# Patient Record
Sex: MALE | Race: WHITE | NOT HISPANIC OR LATINO | Employment: FULL TIME | URBAN - METROPOLITAN AREA
[De-identification: names, ages, dates, MRNs, and addresses within clinical notes are randomized per-mention and may not be internally consistent; named-entity substitution may affect disease eponyms.]

---

## 2017-02-07 ENCOUNTER — OFFICE VISIT (OUTPATIENT)
Dept: MEDICAL GROUP | Facility: MEDICAL CENTER | Age: 32
End: 2017-02-07
Payer: COMMERCIAL

## 2017-02-07 VITALS
DIASTOLIC BLOOD PRESSURE: 90 MMHG | OXYGEN SATURATION: 97 % | WEIGHT: 197 LBS | RESPIRATION RATE: 16 BRPM | HEART RATE: 102 BPM | BODY MASS INDEX: 23.99 KG/M2 | SYSTOLIC BLOOD PRESSURE: 132 MMHG | TEMPERATURE: 98.8 F | HEIGHT: 76 IN

## 2017-02-07 DIAGNOSIS — N34.2 URETHRITIS: ICD-10-CM

## 2017-02-07 PROCEDURE — 99213 OFFICE O/P EST LOW 20 MIN: CPT | Performed by: INTERNAL MEDICINE

## 2017-02-07 RX ORDER — DOXYCYCLINE HYCLATE 100 MG
100 TABLET ORAL 2 TIMES DAILY
Qty: 12 TAB | Refills: 0 | Status: SHIPPED | OUTPATIENT
Start: 2017-02-07 | End: 2017-02-15

## 2017-02-07 NOTE — ASSESSMENT & PLAN NOTE
This is a patient of Dr. Arnold who is seen today on a same-day appointment reporting that in January about 2 weeks ago he and his wife had a motel room and had fairly vigorous intercourse that night. Since that time he has noticed intermittent dysuria that he describes as a burning at the tip of his penis at the end of urinating. He denies urethral discharge. He denies nocturia or urinary frequency. Neither he nor his wife had other sexual partners in a very long time. He has not noticed any lymphadenopathy. He denies fevers or chills. He denies any rectal or prostate discomfort. Symptoms are not changing significantly. He denies similar such prior symptoms.

## 2017-02-07 NOTE — MR AVS SNAPSHOT
"        Roge Meade   2017 9:00 AM   Office Visit   MRN: [id number]    Department:  [address] [place] [address]   Dept Phone:  [phone]    Description:  Male : 1985   Provider:  Pierre Phillip M.D.           Reason for Visit     Dysuria possible      Allergies as of 2017     Allergen Noted Reactions    Sulfa Drugs 10/20/2016   Anaphylaxis      You were diagnosed with     Urethritis   [[id number]]         Vital Signs     Blood Pressure Pulse Temperature Respirations Height Weight    132/90 mmHg 102 37.1 °C (98.8 °F) 16 1.93 m (6' 3.98\") 89.359 kg (197 lb)    Body Mass Index Oxygen Saturation Smoking Status             23.99 kg/m2 97% Never Smoker          Basic Information     Date Of Birth Sex Race Ethnicity Preferred Language    1985 Male White Non- English      Health Maintenance        Date Due Completion Dates    IMM DTaP/Tdap/Td Vaccine (1 - Tdap) 2004 ---    IMM INFLUENZA (1) 2016 ---            Current Immunizations     Tuberculin Skin Test 2015      Below and/or attached are the medications your provider expects you to take. Review all of your home medications and newly ordered medications with your provider and/or pharmacist. Follow medication instructions as directed by your provider and/or pharmacist. Please keep your medication list with you and share with your provider. Update the information when medications are discontinued, doses are changed, or new medications (including over-the-counter products) are added; and carry medication information at all times in the event of emergency situations     Allergies:  SULFA DRUGS - Anaphylaxis               Medications  Valid as of: 2017 - 10:37 AM    Generic Name Brand Name Tablet Size Instructions for use    Aspirin (Tab) aspirin 81 MG Take 81 mg by mouth every day.        Doxycycline Hyclate (Tab) VIBRAMYCIN 100 MG Take 1 Tab by mouth 2 times a day.        Lisinopril (Tab) PRINIVIL 10 MG Take 10 mg by " mouth every day.        Losartan Potassium (Tab) COZAAR 25 MG Take 1 Tab by mouth every day.        .                 Medicines prescribed today were sent to:     [facility] [address] [place] [address], [place] - [address] [place] [address]    Phone: [phone] Fax: [phone]    Open 24 Hours?: No      Medication refill instructions:       If your prescription bottle indicates you have medication refills left, it is not necessary to call your provider’s office. Please contact your pharmacy and they will refill your medication.    If your prescription bottle indicates you do not have any refills left, you may request refills at any time through one of the following ways: The online [employer] system (except Urgent Care), by calling your provider’s office, or by asking your pharmacy to contact your provider’s office with a refill request. Medication refills are processed only during regular business hours and may not be available until the next business day. Your provider may request additional information or to have a follow-up visit with you prior to refilling your medication.   *Please Note: Medication refills are assigned a new Rx number when refilled electronically. Your pharmacy may indicate that no refills were authorized even though a new prescription for the same medication is available at the pharmacy. Please request the medicine by name with the pharmacy before contacting your provider for a refill.           [employer] Status: Patient Declined

## 2017-02-07 NOTE — PROGRESS NOTES
Subjective:     Chief Complaint   Patient presents with   • Dysuria     possible     Roge Meade is a 31 y.o. male here today for evaluation of burning discomfort at the end of urinating at the tip of his penis.    Urethritis  This is a patient of Dr. Arnold who is seen today on a same-day appointment reporting that in January about 2 weeks ago he and his wife had a motel room and had fairly vigorous intercourse that night. Since that time he has noticed intermittent dysuria that he describes as a burning at the tip of his penis at the end of urinating. He denies urethral discharge. He denies nocturia or urinary frequency. Neither he nor his wife had other sexual partners in a very long time. He has not noticed any lymphadenopathy. He denies fevers or chills. He denies any rectal or prostate discomfort. Symptoms are not changing significantly. He denies similar such prior symptoms. His wife does not have any symptoms of vaginitis or other irritation.         The encounter diagnosis was Urethritis.    Allergies: Sulfa drugs  Current medicines (including changes today)  Current Outpatient Prescriptions   Medication Sig Dispense Refill   • doxycycline (VIBRAMYCIN) 100 MG Tab Take 1 Tab by mouth 2 times a day. 12 Tab 0   • lisinopril (PRINIVIL) 10 MG Tab Take 10 mg by mouth every day.     • losartan (COZAAR) 25 MG Tab Take 1 Tab by mouth every day. 30 Tab 0   • aspirin 81 MG tablet Take 81 mg by mouth every day.       No current facility-administered medications for this visit.       He  has a past medical history of Knee joint cyst (2002).    ROS    Patient denies significant change in strength, weight or appetite.  No new dyspnea, coughing, chest pain, or palpitations.  No indigestion, abdominal pain, or change in bowel habits.  No change in urinating. Mild burning discomfort at the end of urination at the tip of his penis.  No new ankle swelling.       Objective:     PE:  /90 mmHg  Pulse 102  Temp(Src)  "37.1 °C (98.8 °F)  Resp 16  Ht 1.93 m (6' 3.98\")  Wt 89.359 kg (197 lb)  BMI 23.99 kg/m2  SpO2 97%  Neck is supple without significant lymphadenopathy or masses.  Lungs are clear with normal breath sounds without wheezes or rales .  Cardiovascular: peripheral circulation is satisfactory, heart sounds are unchanged and unremarkable.  Abdomen is soft, without masses or tenderness, with normal bowel sounds. There is no CVA tenderness. There is no inguinal adenopathy. Genitalia are normal male without evidence of urethral discharge or inflammation.  Extremities are without significant edema, cyanosis or deformity.      Assessment and Plan:   The following treatment plan was discussed  1. Urethritis      We will treat with doxycycline assuming this is nonspecific urethritis. If he is not better or if he worsens at all, he will contact us.       Followup: He will keep his next scheduled appointment with Dr. Arnold or seek further medical treatment as needed if symptoms persist or do not improve.             "

## 2017-02-10 ENCOUNTER — TELEPHONE (OUTPATIENT)
Dept: MEDICAL GROUP | Facility: MEDICAL CENTER | Age: 32
End: 2017-02-10

## 2017-02-10 NOTE — TELEPHONE ENCOUNTER
1. Caller Name: Roge Meade                                           Call Back Number: [phone] (home) [phone] (work)        Patient approves a detailed voicemail message: N\A    Patient stated that he saw you 1 time on 2/7, he said that you prescribed the medication doxycycline (VIBRAMYCIN) 100 MG Tab , he stated that you told him to call back if he was not improving, Patient states that he has not improved with the medication and he states he is having pain and soreness in the middle upper part of his back not on the spine but on the sides of his back, Patient wanted to know what else he can take or do for this? Thank you Please advise

## 2017-02-12 DIAGNOSIS — R30.0 DYSURIA: ICD-10-CM

## 2017-02-13 ENCOUNTER — HOSPITAL ENCOUNTER (OUTPATIENT)
Dept: LAB | Facility: MEDICAL CENTER | Age: 32
End: 2017-02-13
Attending: INTERNAL MEDICINE
Payer: COMMERCIAL

## 2017-02-13 DIAGNOSIS — R30.0 DYSURIA: ICD-10-CM

## 2017-02-13 LAB
APPEARANCE UR: CLEAR
BILIRUB UR QL STRIP.AUTO: NEGATIVE
COLOR UR AUTO: YELLOW
CULTURE IF INDICATED INDCX: NO UA CULTURE
GLUCOSE UR STRIP.AUTO-MCNC: NEGATIVE MG/DL
KETONES UR STRIP.AUTO-MCNC: NEGATIVE MG/DL
LEUKOCYTE ESTERASE UR QL STRIP.AUTO: NEGATIVE
MICRO URNS: NORMAL
NITRITE UR QL STRIP.AUTO: NEGATIVE
PH UR: 5.5 [PH]
PROT UR QL STRIP: NEGATIVE MG/DL
RBC UR QL AUTO: NEGATIVE
SP GR UR STRIP.AUTO: 1.02

## 2017-02-13 PROCEDURE — 87491 CHLMYD TRACH DNA AMP PROBE: CPT

## 2017-02-13 PROCEDURE — 81003 URINALYSIS AUTO W/O SCOPE: CPT

## 2017-02-13 PROCEDURE — 87591 N.GONORRHOEAE DNA AMP PROB: CPT

## 2017-02-14 LAB
C TRACH DNA GENITAL QL NAA+PROBE: NEGATIVE
N GONORRHOEA DNA GENITAL QL NAA+PROBE: NEGATIVE
SPECIMEN SOURCE: NORMAL

## 2017-02-15 ENCOUNTER — OFFICE VISIT (OUTPATIENT)
Dept: MEDICAL GROUP | Facility: PHYSICIAN GROUP | Age: 32
End: 2017-02-15
Payer: COMMERCIAL

## 2017-02-15 VITALS
HEIGHT: 76 IN | TEMPERATURE: 100.3 F | HEART RATE: 102 BPM | DIASTOLIC BLOOD PRESSURE: 78 MMHG | SYSTOLIC BLOOD PRESSURE: 128 MMHG | OXYGEN SATURATION: 96 % | BODY MASS INDEX: 27.4 KG/M2 | WEIGHT: 225 LBS | RESPIRATION RATE: 16 BRPM

## 2017-02-15 DIAGNOSIS — R30.0 DYSURIA: ICD-10-CM

## 2017-02-15 PROCEDURE — 99214 OFFICE O/P EST MOD 30 MIN: CPT | Performed by: INTERNAL MEDICINE

## 2017-02-15 RX ORDER — PHENAZOPYRIDINE HYDROCHLORIDE 200 MG/1
200 TABLET, FILM COATED ORAL 3 TIMES DAILY PRN
Qty: 6 TAB | Refills: 0 | Status: SHIPPED | OUTPATIENT
Start: 2017-02-15 | End: 2017-09-06

## 2017-02-15 NOTE — MR AVS SNAPSHOT
"        Roge Pereiradugo   2/15/2017 2:20 PM   Office Visit   MRN: [id number]    Department:  [facility]   Dept Phone:  [phone]    Description:  Male : 1985   Provider:  Tiff Cox M.D.           Reason for Visit     Dysuria had UA done on 17 has finished antibiotics but still has pain and frequency       Allergies as of 2/15/2017     Allergen Noted Reactions    Sulfa Drugs 10/20/2016   Anaphylaxis      You were diagnosed with     Dysuria   [788.1.ICD-9-CM]         Vital Signs     Blood Pressure Pulse Temperature Respirations Height Weight    128/78 mmHg 102 37.9 °C (100.3 °F) 16 1.93 m (6' 3.98\") 102.059 kg (225 lb)    Body Mass Index Oxygen Saturation Smoking Status             27.40 kg/m2 96% Never Smoker          Basic Information     Date Of Birth Sex Race Ethnicity Preferred Language    1985 Male White Non- English      Problem List              ICD-10-CM Priority Class Noted - Resolved    Urethritis N34.2   2017 - Present    Dysuria R30.0   2/15/2017 - Present      Health Maintenance        Date Due Completion Dates    IMM DTaP/Tdap/Td Vaccine (1 - Tdap) 2004 ---    IMM INFLUENZA (1) 2016 ---            Current Immunizations     Tuberculin Skin Test 2015      Below and/or attached are the medications your provider expects you to take. Review all of your home medications and newly ordered medications with your provider and/or pharmacist. Follow medication instructions as directed by your provider and/or pharmacist. Please keep your medication list with you and share with your provider. Update the information when medications are discontinued, doses are changed, or new medications (including over-the-counter products) are added; and carry medication information at all times in the event of emergency situations     Allergies:  SULFA DRUGS - Anaphylaxis               Medications  Valid as of: February 15, 2017 -  2:55 PM    Generic Name Brand Name " Tablet Size Instructions for use    Aspirin (Tab) aspirin 81 MG Take 81 mg by mouth every day.        Lisinopril (Tab) PRINIVIL 10 MG Take 10 mg by mouth every day.        Phenazopyridine HCl (Tab) PYRIDIUM 200 MG Take 1 Tab by mouth 3 times a day as needed.        .                 Medicines prescribed today were sent to:     [facility] [address] [place] [address], [place] - [address] [facility]    [address] [facility] [place] [address]    Phone: [phone] Fax: [phone]    Open 24 Hours?: No      Medication refill instructions:       If your prescription bottle indicates you have medication refills left, it is not necessary to call your provider’s office. Please contact your pharmacy and they will refill your medication.    If your prescription bottle indicates you do not have any refills left, you may request refills at any time through one of the following ways: The online [employer] system (except Urgent Care), by calling your provider’s office, or by asking your pharmacy to contact your provider’s office with a refill request. Medication refills are processed only during regular business hours and may not be available until the next business day. Your provider may request additional information or to have a follow-up visit with you prior to refilling your medication.   *Please Note: Medication refills are assigned a new Rx number when refilled electronically. Your pharmacy may indicate that no refills were authorized even though a new prescription for the same medication is available at the pharmacy. Please request the medicine by name with the pharmacy before contacting your provider for a refill.           [employer] Access Code: Activation code not generated  Current [employer] Status: Active

## 2017-02-15 NOTE — PROGRESS NOTES
Chief Complaint   Patient presents with   • Dysuria     had UA done on 17 has finished antibiotics but still has pain and frequency        HISTORY OF PRESENT ILLNESS: Patient is a 31 y.o. male patient who presents today for follow-up of dysuria which was treated as urethritis.     1. Dysuria    Approximately 3 weeks ago the patient developed urinary urgency, frequency, dysuria, and burning at the tip of his penis. He was seen by Dr. Phillip and treated empirically for urethritis with doxycycline. He had urinalysis, GC, and chlamydia testing done all of which were completely negative. The patient is extremely anxious because his father  of bladder cancer at age 54 and he presented with similar symptoms to what the patient is experiencing today. He also reports some lower back pain, and question of a rash in his groin. He is quite anxious and agitated today regarding all of his symptoms.      Patient Active Problem List    Diagnosis Date Noted   • Dysuria 02/15/2017   • Urethritis 2017      Allergies:Sulfa drugs    Current meds including changes today  Current Outpatient Prescriptions   Medication Sig Dispense Refill   • phenazopyridine (PYRIDIUM) 200 MG Tab Take 1 Tab by mouth 3 times a day as needed. 6 Tab 0   • lisinopril (PRINIVIL) 10 MG Tab Take 10 mg by mouth every day.     • aspirin 81 MG tablet Take 81 mg by mouth every day.       No current facility-administered medications for this visit.     Social History   Substance Use Topics   • Smoking status: Never Smoker    • Smokeless tobacco: Never Used      Comment: continue to avoid   • Alcohol Use: 4.2 oz/week     7 Shots of liquor per week     Social History     Social History Narrative       Family History   Problem Relation Age of Onset   • Stroke Mother    • Diabetes Mother    • Hypertension Father    • Cancer Father      bladder   • Alcohol/Drug Father    • Psychiatry Sister      eating disorder   • Asthma Son    • No Known Problems Daughter  "       Review of Systems:  No chest pain, No shortness of breath, No dyspnea on exertion  Gastrointestinal ROS: No abdominal pain, No nausea, vomiting, diarrhea, or constipation      Exam:  Pleasant extremely anxious man fairly tremulous and somewhat diaphoretic  Blood pressure 128/78, pulse 102, temperature 37.9 °C (100.3 °F), resp. rate 16, height 1.93 m (6' 3.98\"), weight 102.059 kg (225 lb), SpO2 96 %.  General:  Well nourished, well developed male in NAD affect and mood within normal limits  Head is grossly normal.  Neck: Supple without adenopathy  Pulmonary: Clear to ausculation.  Normal effort. No rales, rhonchi, or wheezing.  Cardiovascular: Regular rate and rhythm without murmur.   Extremities: no clubbing, cyanosis, or edema.  : Penis is normal without discharge he has several scaly small erythematous patches in bilateral groin area consistent with tinea.  Neuro: moves all extremities symmetrically      Please note that this dictation was created using voice recognition software. I have made every reasonable attempt to correct obvious errors, but I expect that there are errors of grammar and possibly content that I did not discover before finalizing the note.    Assessment/Plan:  1. Dysuria    At this time I have no etiology for the patient's symptoms. He is extremely anxious and it is possible that some of this is anxiety induced. Will give a trial of bladder analgesia with Pyridium, I did reassure patient that I didn't think anything sinister was coming on. There is definitely no evidence for any STD, other infection, or cancer. If he continues to have symptoms over the next week or 2 the next step would be referral to urologist for possible cystoscopy.  - phenazopyridine (PYRIDIUM) 200 MG Tab; Take 1 Tab by mouth 3 times a day as needed.  Dispense: 6 Tab; Refill: 0    Followup: No Follow-up on file.            "

## 2017-02-16 ENCOUNTER — TELEPHONE (OUTPATIENT)
Dept: MEDICAL GROUP | Facility: PHYSICIAN GROUP | Age: 32
End: 2017-02-16

## 2017-02-16 ENCOUNTER — PATIENT MESSAGE (OUTPATIENT)
Dept: MEDICAL GROUP | Facility: PHYSICIAN GROUP | Age: 32
End: 2017-02-16

## 2017-02-16 DIAGNOSIS — R30.0 DYSURIA: ICD-10-CM

## 2017-02-16 NOTE — TELEPHONE ENCOUNTER
1. Caller Name: Roge                                         Call Back Number: [phone] (home) [phone] (work)        Patient approves a detailed voicemail message: N\A    2. SPECIFIC Action To Be Taken: Referral Needed    3. Diagnosis/Clinical Reason for Request:   Urethritis   Dysuria             4. Specialty & Provider Name/Lab/Imaging Location: Urology    5. Is appointment scheduled for requested order/referral: no    Patient informed they will receive a return phone call from the office ONLY if there are any questions before processing their request. Advised to call back if they haven't received a call from the referral department in 5 days.

## 2017-03-13 ENCOUNTER — HOSPITAL ENCOUNTER (OUTPATIENT)
Dept: LAB | Facility: MEDICAL CENTER | Age: 32
End: 2017-03-13
Attending: INTERNAL MEDICINE
Payer: COMMERCIAL

## 2017-03-13 ENCOUNTER — OFFICE VISIT (OUTPATIENT)
Dept: MEDICAL GROUP | Facility: PHYSICIAN GROUP | Age: 32
End: 2017-03-13
Payer: COMMERCIAL

## 2017-03-13 VITALS
BODY MASS INDEX: 26.67 KG/M2 | HEART RATE: 94 BPM | DIASTOLIC BLOOD PRESSURE: 90 MMHG | OXYGEN SATURATION: 99 % | SYSTOLIC BLOOD PRESSURE: 130 MMHG | HEIGHT: 76 IN | WEIGHT: 219 LBS | TEMPERATURE: 99.7 F | RESPIRATION RATE: 16 BRPM

## 2017-03-13 DIAGNOSIS — R30.0 DYSURIA: ICD-10-CM

## 2017-03-13 DIAGNOSIS — R10.11 RUQ CRAMPING: ICD-10-CM

## 2017-03-13 LAB
ALBUMIN SERPL BCP-MCNC: 4.8 G/DL (ref 3.2–4.9)
ALBUMIN/GLOB SERPL: 1.8 G/DL
ALP SERPL-CCNC: 47 U/L (ref 30–99)
ALT SERPL-CCNC: 21 U/L (ref 2–50)
ANION GAP SERPL CALC-SCNC: 7 MMOL/L (ref 0–11.9)
APPEARANCE UR: CLEAR
AST SERPL-CCNC: 23 U/L (ref 12–45)
BASOPHILS # BLD AUTO: 0.04 K/UL (ref 0–0.12)
BASOPHILS NFR BLD AUTO: 0.7 % (ref 0–1.8)
BILIRUB SERPL-MCNC: 0.9 MG/DL (ref 0.1–1.5)
BILIRUB UR QL STRIP.AUTO: NEGATIVE
BUN SERPL-MCNC: 14 MG/DL (ref 8–22)
CALCIUM SERPL-MCNC: 9.7 MG/DL (ref 8.5–10.5)
CHLORIDE SERPL-SCNC: 103 MMOL/L (ref 96–112)
CO2 SERPL-SCNC: 27 MMOL/L (ref 20–33)
COLOR UR AUTO: COLORLESS
CREAT SERPL-MCNC: 0.97 MG/DL (ref 0.5–1.4)
CULTURE IF INDICATED INDCX: NO UA CULTURE
EOSINOPHIL # BLD: 0.04 K/UL (ref 0–0.51)
EOSINOPHIL NFR BLD AUTO: 0.7 % (ref 0–6.9)
ERYTHROCYTE [DISTWIDTH] IN BLOOD BY AUTOMATED COUNT: 40.1 FL (ref 35.9–50)
GLOBULIN SER CALC-MCNC: 2.6 G/DL (ref 1.9–3.5)
GLUCOSE SERPL-MCNC: 101 MG/DL (ref 65–99)
GLUCOSE UR STRIP.AUTO-MCNC: NEGATIVE MG/DL
HCT VFR BLD AUTO: 46.1 % (ref 42–52)
HGB BLD-MCNC: 15.3 G/DL (ref 14–18)
IMM GRANULOCYTES # BLD AUTO: 0.02 K/UL (ref 0–0.11)
IMM GRANULOCYTES NFR BLD AUTO: 0.4 % (ref 0–0.9)
KETONES UR STRIP.AUTO-MCNC: NEGATIVE MG/DL
LEUKOCYTE ESTERASE UR QL STRIP.AUTO: NEGATIVE
LYMPHOCYTES # BLD: 1.96 K/UL (ref 1–4.8)
LYMPHOCYTES NFR BLD AUTO: 35.8 % (ref 22–41)
MCH RBC QN AUTO: 30.4 PG (ref 27–33)
MCHC RBC AUTO-ENTMCNC: 33.2 G/DL (ref 33.7–35.3)
MCV RBC AUTO: 91.7 FL (ref 81.4–97.8)
MICRO URNS: NORMAL
MONOCYTES # BLD: 0.45 K/UL (ref 0–0.85)
MONOCYTES NFR BLD AUTO: 8.2 % (ref 0–13.4)
NEUTROPHILS # BLD: 2.97 K/UL (ref 1.82–7.42)
NEUTROPHILS NFR BLD AUTO: 54.2 % (ref 44–72)
NITRITE UR QL STRIP.AUTO: NEGATIVE
NRBC # BLD AUTO: 0 K/UL
NRBC BLD-RTO: 0 /100 WBC
PH UR: 7 [PH]
PLATELET # BLD AUTO: 208 K/UL (ref 164–446)
PMV BLD AUTO: 11.1 FL (ref 9–12.9)
POTASSIUM SERPL-SCNC: 4.6 MMOL/L (ref 3.6–5.5)
PROT SERPL-MCNC: 7.4 G/DL (ref 6–8.2)
PROT UR QL STRIP: NEGATIVE MG/DL
RBC # BLD AUTO: 5.03 M/UL (ref 4.7–6.1)
RBC UR QL AUTO: NEGATIVE
SODIUM SERPL-SCNC: 137 MMOL/L (ref 135–145)
SP GR UR STRIP.AUTO: 1.01
WBC # BLD AUTO: 5.5 K/UL (ref 4.8–10.8)

## 2017-03-13 PROCEDURE — 99214 OFFICE O/P EST MOD 30 MIN: CPT | Performed by: INTERNAL MEDICINE

## 2017-03-13 PROCEDURE — 36415 COLL VENOUS BLD VENIPUNCTURE: CPT

## 2017-03-13 PROCEDURE — 81003 URINALYSIS AUTO W/O SCOPE: CPT

## 2017-03-13 PROCEDURE — 80053 COMPREHEN METABOLIC PANEL: CPT

## 2017-03-13 PROCEDURE — 85025 COMPLETE CBC W/AUTO DIFF WBC: CPT

## 2017-03-13 ASSESSMENT — PAIN SCALES - GENERAL: PAINLEVEL: 4=SLIGHT-MODERATE PAIN

## 2017-03-13 NOTE — MR AVS SNAPSHOT
"        Roge Gabebladimir Meade   3/13/2017 8:45 AM   Office Visit   MRN: [id number]    Department:  [facility]   Dept Phone:  [phone]    Description:  Male : 1985   Provider:  Chio Barillas D.O.           Reason for Visit     Dysuria Painful urination x2 mos    Dizziness dizziness & abdominal pain x1 week      Allergies as of 3/13/2017     Allergen Noted Reactions    Sulfa Drugs 10/20/2016   Anaphylaxis      You were diagnosed with     RUQ cramping   [903107]       Dysuria   [788.1.ICD-9-CM]         Vital Signs     Blood Pressure Pulse Temperature Respirations Height Weight    130/90 mmHg 94 37.6 °C (99.7 °F) 16 1.93 m (6' 3.98\") 99.338 kg (219 lb)    Body Mass Index Oxygen Saturation Smoking Status             26.67 kg/m2 99% Never Smoker          Basic Information     Date Of Birth Sex Race Ethnicity Preferred Language    1985 Male White Non- English      Your appointments     Mar 19, 2017  9:30 AM   US ABDOMEN FASTING (30 MINUTES) with [facility] ([place]    [address] [facility] [id number] [phone]           NPO 8 hours.  For  Abdomen, NPO 2 hours, schedule Abdomen Complete and include \" Abdomen\" in Appt Notes.              Problem List              ICD-10-CM Priority Class Noted - Resolved    Urethritis N34.2   2017 - Present    Dysuria R30.0   2/15/2017 - Present      Health Maintenance        Date Due Completion Dates    IMM DTaP/Tdap/Td Vaccine (1 - Tdap) 2004 ---    IMM INFLUENZA (1) 2016 ---            Current Immunizations     Tuberculin Skin Test 2015      Below and/or attached are the medications your provider expects you to take. Review all of your home medications and newly ordered medications with your provider and/or pharmacist. Follow medication instructions as directed by your provider and/or pharmacist. Please keep your medication list with you and share with your provider. Update the information when " medications are discontinued, doses are changed, or new medications (including over-the-counter products) are added; and carry medication information at all times in the event of emergency situations     Allergies:  SULFA DRUGS - Anaphylaxis               Medications  Valid as of: March 13, 2017 - 11:55 AM    Generic Name Brand Name Tablet Size Instructions for use    Aspirin (Tab) aspirin 81 MG Take 81 mg by mouth every day.        Lisinopril (Tab) PRINIVIL 10 MG Take 10 mg by mouth every day.        Phenazopyridine HCl (Tab) PYRIDIUM 200 MG Take 1 Tab by mouth 3 times a day as needed.        .                 Medicines prescribed today were sent to:     [facility] [address] [place] [address], [place] - 250 [facility]    250 [facility] [place] [address]    Phone: [phone] Fax: [phone]    Open 24 Hours?: No      Medication refill instructions:       If your prescription bottle indicates you have medication refills left, it is not necessary to call your provider’s office. Please contact your pharmacy and they will refill your medication.    If your prescription bottle indicates you do not have any refills left, you may request refills at any time through one of the following ways: The online [employer] system (except Urgent Care), by calling your provider’s office, or by asking your pharmacy to contact your provider’s office with a refill request. Medication refills are processed only during regular business hours and may not be available until the next business day. Your provider may request additional information or to have a follow-up visit with you prior to refilling your medication.   *Please Note: Medication refills are assigned a new Rx number when refilled electronically. Your pharmacy may indicate that no refills were authorized even though a new prescription for the same medication is available at the pharmacy. Please request the medicine by name with the pharmacy before contacting your provider for a  refill.        Your To Do List     Future Labs/Procedures Complete By Expires    CBC WITH DIFFERENTIAL  As directed 3/14/2018    COMP METABOLIC PANEL  As directed 3/14/2018    URINALYSIS,CULTURE IF INDICATED  As directed 3/13/2018    US-LIVER AND BILIARY TREE  As directed 9/13/2017      Instructions    Abdominal Pain (Nonspecific)  Your exam might not show the exact reason you have abdominal pain. Since there are many different causes of abdominal pain, another checkup and more tests may be needed. It is very important to follow up for lasting (persistent) or worsening symptoms. A possible cause of abdominal pain in any person who still has his or her appendix is acute appendicitis. Appendicitis is often hard to diagnose. Normal blood tests, urine tests, ultrasound, and CT scans do not completely rule out early appendicitis or other causes of abdominal pain. Sometimes, only the changes that happen over time will allow appendicitis and other causes of abdominal pain to be determined. Other potential problems that may require surgery may also take time to become more apparent. Because of this, it is important that you follow all of the instructions below.  HOME CARE INSTRUCTIONS   · Rest as much as possible.   · Do not eat solid food until your pain is gone.   · While adults or children have pain: A diet of water, weak decaffeinated tea, broth or bouillon, gelatin, oral rehydration solutions (ORS), frozen ice pops, or ice chips may be helpful.   · When pain is gone in adults or children: Start a light diet (dry toast, crackers, applesauce, or white rice). Increase the diet slowly as long as it does not bother you. Eat no dairy products (including cheese and eggs) and no spicy, fatty, fried, or high-fiber foods.   · Use no alcohol, caffeine, or cigarettes.   · Take your regular medicines unless your caregiver told you not to.   · Take any prescribed medicine as directed.   · Only take over-the-counter or prescription  medicines for pain, discomfort, or fever as directed by your caregiver. Do not give aspirin to children.   If your caregiver has given you a follow-up appointment, it is very important to keep that appointment. Not keeping the appointment could result in a permanent injury and/or lasting (chronic) pain and/or disability. If there is any problem keeping the appointment, you must call to reschedule.   SEEK IMMEDIATE MEDICAL CARE IF:   · Your pain is not gone in 24 hours.   · Your pain becomes worse, changes location, or feels different.   · You or your child has an oral temperature above 102° F (38.9° C), not controlled by medicine.   · Your baby is older than 3 months with a rectal temperature of 102° F (38.9° C) or higher.   · Your baby is 3 months old or younger with a rectal temperature of 100.4° F (38° C) or higher.   · You have shaking chills.   · You keep throwing up (vomiting) or cannot drink liquids.   · There is blood in your vomit or you see blood in your bowel movements.   · Your bowel movements become dark or black.   · You have frequent bowel movements.   · Your bowel movements stop (become blocked) or you cannot pass gas.   · You have bloody, frequent, or painful urination.   · You have yellow discoloration in the skin or whites of the eyes.   · Your stomach becomes bloated or bigger.   · You have dizziness or fainting.   · You have chest or back pain.   MAKE SURE YOU:   · Understand these instructions.   · Will watch your condition.   · Will get help right away if you are not doing well or get worse.   Document Released: 12/18/2006 Document Revised: 03/11/2013 Document Reviewed: 11/15/2010  ExitCare® Patient Information ©2013 [employer].          [employer] Access Code: Activation code not generated  Current Mark Forged Status: Active

## 2017-03-13 NOTE — PROGRESS NOTES
Subjective:   Roge Meade is a 31 y.o. male here today for multiple problems as listed below.  Patient's usual primary care provider is Amparo Arnold D.O. and is new to me.     Symptom onset: 1-2 weeks ago   Current symptoms: Dysuria. No blood noted in urine. Symptoms had started the end of January after intercourse, patient noticed forceful somewhat painful urination and was treated for urinary tract infection with doxycycline. He also took cranberry pills and has been increasing his water intake. He continued to have dysuria although the symptoms are now lessened in frequency is also lessened. Was seen again a few months later and prescribed  Pyridium which did not help symptoms. He was referred to urology and has an appointment on Friday.  Since onset symptoms are: Improved, different-patient noticed 1-2 weeks ago he had started to have some other symptoms including right-sided abdominal pain (located under right rib and worse with palpation and deep inspiration-states it feels a bruise and sometimes radiates to others in the abdomen. Also had the seated bloating with nausea and increased gas/burping. No heartburn symptoms. States that he usually eats healthy but did have Burger during this time. He found this bowel movements have gotten less frequent-they appear more solid, oily, breaking apart more although they are not loose. He has some chills and sweating about half an hour in the evenings. No new foods or sick contacts. Abdomen discomfort described as a soreness/tightness especially when taking a deep breath. Patient was concerned about blood pressure medication, lisinopril which started in November-had bloody stool to time ×1 which resolved after continuing medication for 3-4 days. Did message his primary care physician about possibly stopping this as he also had some episodes of dizziness and blurred vision - currently not on lisinopril. Blood pressure-diastolic is only slightly elevated today.   "Appetite is normal. No vomiting. Concerned about gallstones through his discussion with his physician uncle who is a urologist.  Treatments tried: antispasm med. Doxycycline. Increasing hydration.  Associated symptoms: negative for vomiting, pelvic pain, current blood in stools/urine.  History is negative for frequent UTI.   Recent labs reviewed from Feb 2017 - neg GC/Chlamydia.     Objective:     Blood pressure 130/90, pulse 94, temperature 37.6 °C (99.7 °F), resp. rate 16, height 1.93 m (6' 3.98\"), weight 99.338 kg (219 lb), SpO2 99 %.  Gen: alert, NAD.  Chest: lungs clear to auscultation, CV RRR   Abdomen soft, mildy tender in suprapubic region, nondistended. Normal bowel sounds are auscultated. No organomegaly is appreciated. No masses are palpated.No CVAT. No guarding, rigidity or rebound tenderness is seen on exam. Michaels and McBurney sign is negative.  Ext. No edema, normal vascularity, color.    Assessment and Plan:   The following treatment plan was discussed   1. RUQ cramping  COMP METABOLIC PANEL    US-LIVER AND BILIARY TREE    CBC WITH DIFFERENTIAL   2. Dysuria  URINALYSIS,CULTURE IF INDICATED    CANCELED: URINALYSIS,CULTURE IF INDICATED     Symptoms are nonspecific - Ddx includes gallstones, renal stones, UTI, gastroenteritis. Will start with initial lab work and US abdomen and will notify patient with results. Previously treated for urethritis with doxycyline. Advised patient to keep appointment with urology.  RTC if symptoms not improving or in case of vomiting, fever, increasing pain.   EDUCATION: drink plenty of fluids, wipe front to back every void and BM.    Followup: Return if symptoms worsen or fail to improve, for follow-up with PCP.         Please note that dictation has been dictated using voice recognition soft ware. I have made every reasonable attempt to correct obvious errors, but I expect that there are errors of grammar and possibly content that I did not discover before finalizing the " note.

## 2017-03-13 NOTE — PATIENT INSTRUCTIONS
Abdominal Pain (Nonspecific)  Your exam might not show the exact reason you have abdominal pain. Since there are many different causes of abdominal pain, another checkup and more tests may be needed. It is very important to follow up for lasting (persistent) or worsening symptoms. A possible cause of abdominal pain in any person who still has his or her appendix is acute appendicitis. Appendicitis is often hard to diagnose. Normal blood tests, urine tests, ultrasound, and CT scans do not completely rule out early appendicitis or other causes of abdominal pain. Sometimes, only the changes that happen over time will allow appendicitis and other causes of abdominal pain to be determined. Other potential problems that may require surgery may also take time to become more apparent. Because of this, it is important that you follow all of the instructions below.  HOME CARE INSTRUCTIONS   · Rest as much as possible.   · Do not eat solid food until your pain is gone.   · While adults or children have pain: A diet of water, weak decaffeinated tea, broth or bouillon, gelatin, oral rehydration solutions (ORS), frozen ice pops, or ice chips may be helpful.   · When pain is gone in adults or children: Start a light diet (dry toast, crackers, applesauce, or white rice). Increase the diet slowly as long as it does not bother you. Eat no dairy products (including cheese and eggs) and no spicy, fatty, fried, or high-fiber foods.   · Use no alcohol, caffeine, or cigarettes.   · Take your regular medicines unless your caregiver told you not to.   · Take any prescribed medicine as directed.   · Only take over-the-counter or prescription medicines for pain, discomfort, or fever as directed by your caregiver. Do not give aspirin to children.   If your caregiver has given you a follow-up appointment, it is very important to keep that appointment. Not keeping the appointment could result in a permanent injury and/or lasting (chronic) pain  and/or disability. If there is any problem keeping the appointment, you must call to reschedule.   SEEK IMMEDIATE MEDICAL CARE IF:   · Your pain is not gone in 24 hours.   · Your pain becomes worse, changes location, or feels different.   · You or your child has an oral temperature above 102° F (38.9° C), not controlled by medicine.   · Your baby is older than 3 months with a rectal temperature of 102° F (38.9° C) or higher.   · Your baby is 3 months old or younger with a rectal temperature of 100.4° F (38° C) or higher.   · You have shaking chills.   · You keep throwing up (vomiting) or cannot drink liquids.   · There is blood in your vomit or you see blood in your bowel movements.   · Your bowel movements become dark or black.   · You have frequent bowel movements.   · Your bowel movements stop (become blocked) or you cannot pass gas.   · You have bloody, frequent, or painful urination.   · You have yellow discoloration in the skin or whites of the eyes.   · Your stomach becomes bloated or bigger.   · You have dizziness or fainting.   · You have chest or back pain.   MAKE SURE YOU:   · Understand these instructions.   · Will watch your condition.   · Will get help right away if you are not doing well or get worse.   Document Released: 12/18/2006 Document Revised: 03/11/2013 Document Reviewed: 11/15/2010  ExitCare® Patient Information ©2013 [employer].

## 2017-03-19 ENCOUNTER — HOSPITAL ENCOUNTER (OUTPATIENT)
Dept: RADIOLOGY | Facility: MEDICAL CENTER | Age: 32
End: 2017-03-19
Attending: INTERNAL MEDICINE
Payer: COMMERCIAL

## 2017-03-19 DIAGNOSIS — R10.11 RUQ CRAMPING: ICD-10-CM

## 2017-03-19 PROCEDURE — 76705 ECHO EXAM OF ABDOMEN: CPT

## 2017-07-21 ENCOUNTER — OFFICE VISIT (OUTPATIENT)
Dept: MEDICAL GROUP | Facility: MEDICAL CENTER | Age: 32
End: 2017-07-21
Payer: COMMERCIAL

## 2017-07-21 VITALS
HEART RATE: 86 BPM | OXYGEN SATURATION: 98 % | HEIGHT: 75 IN | BODY MASS INDEX: 27.85 KG/M2 | SYSTOLIC BLOOD PRESSURE: 142 MMHG | RESPIRATION RATE: 16 BRPM | TEMPERATURE: 97.5 F | DIASTOLIC BLOOD PRESSURE: 96 MMHG | WEIGHT: 224 LBS

## 2017-07-21 DIAGNOSIS — K62.89 PROCTITIS: ICD-10-CM

## 2017-07-21 DIAGNOSIS — T46.4X5A COUGH DUE TO ACE INHIBITOR: ICD-10-CM

## 2017-07-21 DIAGNOSIS — R05.8 COUGH DUE TO ACE INHIBITOR: ICD-10-CM

## 2017-07-21 DIAGNOSIS — R14.0 ABDOMINAL BLOATING: ICD-10-CM

## 2017-07-21 DIAGNOSIS — R21 RASH: ICD-10-CM

## 2017-07-21 DIAGNOSIS — I10 ESSENTIAL HYPERTENSION: ICD-10-CM

## 2017-07-21 DIAGNOSIS — N34.2 URETHRITIS: ICD-10-CM

## 2017-07-21 PROCEDURE — 99214 OFFICE O/P EST MOD 30 MIN: CPT | Performed by: INTERNAL MEDICINE

## 2017-07-21 RX ORDER — LOSARTAN POTASSIUM 50 MG/1
50 TABLET ORAL DAILY
Qty: 30 TAB | Refills: 11 | Status: SHIPPED | OUTPATIENT
Start: 2017-07-21 | End: 2018-06-22 | Stop reason: SDUPTHER

## 2017-07-21 NOTE — PROGRESS NOTES
Subjective:     Chief Complaint   Patient presents with   • Medication Management     BP elevating discuss bp meds   • Orders Needed     bowel issue      Roge Meade is a 32 y.o. male here today for evaluation of several complaints. He is seen on a same-day basis for Dr. Arnold.    Abdominal bloating  He has a vague nonspecific abdominal bloating sensation. He is not particularly aware of aggravating or alleviating factors. He thinks his diet is fairly good. He does report belching more than previous. He has had abdominal sound which apparently showed no abnormality. He is under a large amount of stress at work.    Cough due to ACE inhibitor  He was on lisinopril for his hypertension. He did have a dry cough during this time. He was taken off of lisinopril because of his GI complaints however. His blood pressure remains elevated he reports. He is under a lot of stress at work.    Essential hypertension  He does have hypertension. He tries to follow a low-salt diet with some success. He was on lisinopril but this was discontinued because of some GI complaints. He also had a dry cough while on lisinopril.    Proctitis  Over the last few months he has noticed some rectal irritation and hot feeling in the rectal area. He does not drink coffee and does not consume much dairy products. He wants to see a gastroenterologist.    Rash  He intermittently gets a groin rash that the urologists are treating with a cream fairly successfully. He tries to keep that area cool and dry.    Urethritis  He still has a burning sensation at the tip of his penis. His urinary frequency however has resolved. He had one course of doxycycline 2 courses of Cipro. He is scheduled for cystoscopy in the fairly near future.       Diagnoses of Proctitis, Abdominal bloating, Essential hypertension, Rash, Cough due to ACE inhibitor, and Urethritis were pertinent to this visit.    Allergies: Sulfa drugs  Current medicines (including changes  "today)  Current Outpatient Prescriptions   Medication Sig Dispense Refill   • losartan (COZAAR) 50 MG Tab Take 1 Tab by mouth every day. 30 Tab 11   • phenazopyridine (PYRIDIUM) 200 MG Tab Take 1 Tab by mouth 3 times a day as needed. 6 Tab 0   • aspirin 81 MG tablet Take 81 mg by mouth every day.       No current facility-administered medications for this visit.       He  has a past medical history of Knee joint cyst (2002).    ROS    Patient denies significant change in strength, weight or appetite.  No significant lightheadedness or headaches.  No change in vision, hearing, or swallowing.  No new dyspnea, coughing, chest pain, or palpitations.  No indigestion, abdominal pain, or change in bowel habits. He does have abdominal bloating as noted and some proctitis.  No change in urinating. He does have some mild burning sensation at the meatus.  No new ankle swelling.       Objective:     PE:  /96 mmHg  Pulse 86  Temp(Src) 36.4 °C (97.5 °F)  Resp 16  Ht 1.905 m (6' 3\")  Wt 101.606 kg (224 lb)  BMI 28.00 kg/m2  SpO2 98%  Neck is supple without significant lymphadenopathy or masses.  Lungs are clear with normal breath sounds without wheezes or rales .  Cardiovascular: peripheral circulation is satisfactory, heart sounds are unchanged and unremarkable.  Abdomen is soft, without masses or tenderness, with normal bowel sounds.  Extremities are without significant edema, cyanosis or deformity.      Assessment and Plan:   The following treatment plan was discussed  1. Proctitis  REFERRAL TO GASTROENTEROLOGY    Follow-up with gastroenterology referral.   2. Abdominal bloating  REFERRAL TO GASTROENTEROLOGY    He will pay attention to aggravating and alleviating factors. He may have primarily irritable bowel syndrome. I did send in a referral to gastroenterology.   3. Essential hypertension      We reviewed low-salt diet. We switched him to losartan. He will check his pressures and report them.   4. Rash      " Antifungal cream and keep the area cool and dry.   5. Cough due to ACE inhibitor      Avoid Ace inhibitors.   6. Urethritis      Follow-up with urology.       Followup: Return to Dr. Arnold in about 6 weeks (around 9/1/2017).

## 2017-07-21 NOTE — MR AVS SNAPSHOT
"        Roge Pereiradugo   2017 11:00 AM   Office Visit   MRN: [id number]    Department:  [address] [place] [address]   Dept Phone:  [phone]    Description:  Male : 1985   Provider:  Pierre Phillip M.D.           Reason for Visit     Medication Management BP elevating discuss bp meds    Orders Needed bowel issue       Allergies as of 2017     Allergen Noted Reactions    Sulfa Drugs 10/20/2016   Anaphylaxis      You were diagnosed with     Proctitis   [010727]       Abdominal bloating   [800397]         Vital Signs     Blood Pressure Pulse Temperature Respirations Height Weight    142/96 mmHg 86 36.4 °C (97.5 °F) 16 1.905 m (6' 3\") 101.606 kg (224 lb)    Body Mass Index Oxygen Saturation Smoking Status             28.00 kg/m2 98% Never Smoker          Basic Information     Date Of Birth Sex Race Ethnicity Preferred Language    1985 Male White Non- English      Your appointments     Sep 06, 2017  9:40 AM   NEW TO YOU with ERMELINDA Diaz   [facility] [address]    [address]  [facility] [address]  [facility] [address]   [phone]              Problem List              ICD-10-CM Priority Class Noted - Resolved    Urethritis N34.2   2017 - Present    Dysuria R30.0   2/15/2017 - Present      Health Maintenance        Date Due Completion Dates    IMM DTaP/Tdap/Td Vaccine (1 - Tdap) 2004 ---    IMM INFLUENZA (1) 2017 ---            Current Immunizations     Tuberculin Skin Test 2015      Below and/or attached are the medications your provider expects you to take. Review all of your home medications and newly ordered medications with your provider and/or pharmacist. Follow medication instructions as directed by your provider and/or pharmacist. Please keep your medication list with you and share with your provider. Update the information when medications are discontinued, doses are changed, or new medications (including over-the-counter products) are " added; and carry medication information at all times in the event of emergency situations     Allergies:  SULFA DRUGS - Anaphylaxis               Medications  Valid as of: July 21, 2017 - 11:48 AM    Generic Name Brand Name Tablet Size Instructions for use    Aspirin (Tab) aspirin 81 MG Take 81 mg by mouth every day.        Losartan Potassium (Tab) COZAAR 50 MG Take 1 Tab by mouth every day.        Phenazopyridine HCl (Tab) PYRIDIUM 200 MG Take 1 Tab by mouth 3 times a day as needed.        .                 Medicines prescribed today were sent to:     [facility] [address] [place] [address], [place] [address] [place] [address]    Phone: [phone] Fax: [phone]    Open 24 Hours?: No      Medication refill instructions:       If your prescription bottle indicates you have medication refills left, it is not necessary to call your provider’s office. Please contact your pharmacy and they will refill your medication.    If your prescription bottle indicates you do not have any refills left, you may request refills at any time through one of the following ways: The online popexpert system (except Urgent Care), by calling your provider’s office, or by asking your pharmacy to contact your provider’s office with a refill request. Medication refills are processed only during regular business hours and may not be available until the next business day. Your provider may request additional information or to have a follow-up visit with you prior to refilling your medication.   *Please Note: Medication refills are assigned a new Rx number when refilled electronically. Your pharmacy may indicate that no refills were authorized even though a new prescription for the same medication is available at the pharmacy. Please request the medicine by name with the pharmacy before contacting your provider for a refill.        Referral     A referral request has been sent to our patient care coordination  department. Please allow 3-5 business days for us to process this request and contact you either by phone or mail. If you do not hear from us by the 5th business day, please call us at [phone].           [employer] Access Code: Activation code not generated  Current [employer] Status: Active

## 2017-07-21 NOTE — ASSESSMENT & PLAN NOTE
He does have hypertension. He tries to follow a low-salt diet with some success. He was on lisinopril but this was discontinued because of some GI complaints. He also had a dry cough while on lisinopril.

## 2017-07-21 NOTE — ASSESSMENT & PLAN NOTE
He has a vague nonspecific abdominal bloating sensation. He is not particularly aware of aggravating or alleviating factors. He thinks his diet is fairly good. He does report belching more than previous. He has had abdominal sound which apparently showed no abnormality.

## 2017-07-21 NOTE — ASSESSMENT & PLAN NOTE
He still has a burning sensation at the tip of his penis. His urinary frequency however has resolved. He had one course of doxycycline 2 courses of Cipro. He is scheduled for cystoscopy in the fairly near future.

## 2017-07-21 NOTE — ASSESSMENT & PLAN NOTE
He was on lisinopril for his hypertension. He did have a dry cough during this time. He was taken off of lisinopril because of his GI complaints however. His blood pressure remains elevated he reports. He is under a lot of stress at work.

## 2017-07-21 NOTE — ASSESSMENT & PLAN NOTE
Over the last few months he has noticed some rectal irritation and hot feeling in the rectal area. He does not drink coffee and does not consume much dairy products. He wants to see a gastroenterologist.

## 2017-07-21 NOTE — ASSESSMENT & PLAN NOTE
He intermittently gets a groin rash that the urologists are treating with a cream fairly successfully. He tries to keep that area cool and dry.

## 2017-08-29 ENCOUNTER — TELEPHONE (OUTPATIENT)
Dept: MEDICAL GROUP | Facility: MEDICAL CENTER | Age: 32
End: 2017-08-29

## 2017-08-29 NOTE — TELEPHONE ENCOUNTER
Future Appointments       Provider Department Center    9/6/2017 9:40 AM ERMELINDA Diaz [facility] [address]        ESTABLISHED PATIENT PRE-VISIT PLANNING     Note: Patient will not be contacted if there is no indication to call.     1.  Reviewed note from last office visit with PCP and/or other med group provider: Yes    2.  If any orders were placed at last visit, do we have Results/Consult Notes?        •  Labs - Labs were not ordered at last office visit.       •  Imaging - Imaging was not ordered at last office visit.       •  Referrals - No referrals were ordered at last office visit.    3.  Immunizations were updated in Epic using Web[employer]?: Yes       •  [employer] Recommendations: FLU and TDAP    4.  Patient is due for the following Health Maintenance Topics:   Health Maintenance Due   Topic Date Due   • IMM DTaP/Tdap/Td Vaccine (1 - Tdap) 06/23/2004   • IMM INFLUENZA (1) 09/01/2017           5.  Patient was not informed to arrive 15 min prior to their scheduled appointment and bring in their medication bottles.

## 2017-09-06 ENCOUNTER — OFFICE VISIT (OUTPATIENT)
Dept: MEDICAL GROUP | Facility: MEDICAL CENTER | Age: 32
End: 2017-09-06
Payer: COMMERCIAL

## 2017-09-06 VITALS
TEMPERATURE: 97.7 F | WEIGHT: 227 LBS | HEIGHT: 75 IN | RESPIRATION RATE: 16 BRPM | OXYGEN SATURATION: 97 % | HEART RATE: 94 BPM | SYSTOLIC BLOOD PRESSURE: 138 MMHG | DIASTOLIC BLOOD PRESSURE: 82 MMHG | BODY MASS INDEX: 28.23 KG/M2

## 2017-09-06 DIAGNOSIS — F41.9 ANXIETY: ICD-10-CM

## 2017-09-06 DIAGNOSIS — I10 ESSENTIAL HYPERTENSION: ICD-10-CM

## 2017-09-06 DIAGNOSIS — L30.9 ECZEMA, UNSPECIFIED TYPE: ICD-10-CM

## 2017-09-06 DIAGNOSIS — Z87.438 H/O PROSTATITIS: ICD-10-CM

## 2017-09-06 PROBLEM — R14.0 ABDOMINAL BLOATING: Status: RESOLVED | Noted: 2017-07-21 | Resolved: 2017-09-06

## 2017-09-06 PROBLEM — N34.2 URETHRITIS: Status: RESOLVED | Noted: 2017-02-07 | Resolved: 2017-09-06

## 2017-09-06 PROBLEM — K62.89 PROCTITIS: Status: RESOLVED | Noted: 2017-07-21 | Resolved: 2017-09-06

## 2017-09-06 PROBLEM — R21 RASH: Status: RESOLVED | Noted: 2017-07-21 | Resolved: 2017-09-06

## 2017-09-06 PROBLEM — R05.8 COUGH DUE TO ACE INHIBITOR: Status: RESOLVED | Noted: 2017-07-21 | Resolved: 2017-09-06

## 2017-09-06 PROBLEM — T46.4X5A COUGH DUE TO ACE INHIBITOR: Status: RESOLVED | Noted: 2017-07-21 | Resolved: 2017-09-06

## 2017-09-06 PROBLEM — R30.0 DYSURIA: Status: RESOLVED | Noted: 2017-02-15 | Resolved: 2017-09-06

## 2017-09-06 PROCEDURE — 99213 OFFICE O/P EST LOW 20 MIN: CPT | Performed by: NURSE PRACTITIONER

## 2017-09-06 RX ORDER — TRIAMCINOLONE ACETONIDE 1 MG/G
1 OINTMENT TOPICAL 2 TIMES DAILY
Qty: 1 TUBE | Refills: 0 | Status: SHIPPED | OUTPATIENT
Start: 2017-09-06 | End: 2019-03-15

## 2017-09-06 ASSESSMENT — ENCOUNTER SYMPTOMS: NERVOUS/ANXIOUS: 1

## 2017-09-06 NOTE — PROGRESS NOTES
Subjective:      Roge Meade is a 32 y.o. male who presents with New Patient (n2u)            HPI Roge MeadeIs a pleasant but anxious 32-year-old male here to establish care and for follow-up on multiple concerns.      1. Essential hypertension  Patient reports he has had hypertension for a few years which has been well controlled with losartan. Blood pressure in the office today is normotensive on medicine.    2. Eczema, unspecified type  Patient states he has been getting a rash mostly of his hands bilaterally for the past 2 months. It sometimes affects his feet as well. He also was treated for a groin rash not too long ago. He was advised by gastroenterology to see dermatology and states he has yet to make an appointment. It is mildly pruritic. It tends to come and go.    3. H/O prostatitis  Patient states his problems started with dysuria in January. It was an on and off feeling of discomfort and not fully emptying his bladder. He was seen at urgent care and started on doxycycline. He then was seen again in another office and tried with Pyridium. By March she had tried Cipro twice and was referred to urology. It appears he had a cystoscopy and the thought was that he had prostatitis for which he was treated. He states he still has some issues with dysuria at the end of urination but was told by a urology there was nothing more that could be found that was wrong with patient. He denies frequency, urgency or hematuria.    4. Anxiety  Patient is very anxious in the office today over his health and mentions frequently about different family members who have had cancer which is what makes him so anxious. He has had ultrasound of abdomen as well as lab work with all of this testing coming back fairly normal. He states he does have a colonoscopy scheduled within the month.  Social History   Substance Use Topics   • Smoking status: Never Smoker   • Smokeless tobacco: Never Used      Comment: continue to  "avoid   • Alcohol use 5.4 oz/week     9 Shots of liquor per week     Current Outpatient Prescriptions   Medication Sig Dispense Refill   • triamcinolone acetonide (KENALOG) 0.1 % Ointment Apply 1 Application to affected area(s) 2 times a day. 1 Tube 0   • losartan (COZAAR) 50 MG Tab Take 1 Tab by mouth every day. 30 Tab 11     No current facility-administered medications for this visit.      Past Medical History:   Diagnosis Date   • Knee joint cyst 2002     Family History   Problem Relation Age of Onset   • Stroke Mother    • Diabetes Mother    • Hypertension Father    • Cancer Father      bladder   • Alcohol/Drug Father    • Psychiatry Sister      eating disorder   • Asthma Son    • No Known Problems Daughter        Review of Systems   Genitourinary: Positive for dysuria.   Skin: Positive for itching and rash.   Psychiatric/Behavioral: The patient is nervous/anxious.    All other systems reviewed and are negative.         Objective:     /82   Pulse 94   Temp 36.5 °C (97.7 °F)   Resp 16   Ht 1.905 m (6' 3\")   Wt 103 kg (227 lb)   SpO2 97%   BMI 28.37 kg/m²      Physical Exam   Constitutional: He is oriented to person, place, and time. He appears well-developed and well-nourished. No distress.   HENT:   Head: Normocephalic and atraumatic.   Right Ear: External ear normal.   Left Ear: External ear normal.   Nose: Nose normal.   Mouth/Throat: Oropharynx is clear and moist.   Eyes: Conjunctivae are normal. Right eye exhibits no discharge. Left eye exhibits no discharge.   Neck: Normal range of motion. Neck supple. No tracheal deviation present. No thyromegaly present.   Cardiovascular: Normal rate, regular rhythm and normal heart sounds.    No murmur heard.  Pulmonary/Chest: Effort normal and breath sounds normal. No respiratory distress. He has no wheezes. He has no rales.   Lymphadenopathy:     He has no cervical adenopathy.   Neurological: He is alert and oriented to person, place, and time. " Coordination normal.   Skin: Skin is warm and dry. No rash noted. He is not diaphoretic. No erythema.   Mildly erythematous and dry skin of the hands bilaterally.   Psychiatric: He has a normal mood and affect. His behavior is normal. Judgment and thought content normal.   Nursing note and vitals reviewed.              Assessment/Plan:     1. Essential hypertension  Patient appears to be doing well on losartan and I will refill this when needed. He will continue to keep his weight under control and avoid alcohol.    2. Eczema, unspecified type  I will have patient try some triamcinolone short-term to the affected areas to help with the pruritus. He is making an appointment with dermatology.  - triamcinolone acetonide (KENALOG) 0.1 % Ointment; Apply 1 Application to affected area(s) 2 times a day.  Dispense: 1 Tube; Refill: 0    3. H/O prostatitis  Patient has been worked up by urology and nothing new has been found. I told him if he continues to have problems he will need to follow back with urology. He does not need any further antibiotics.    4. Anxiety  Patient is very anxious over his health although his workup so far have been normal. He agrees that after his colonoscopy if he continues to have anxiety over his health, he may go to a mental health counselor.

## 2017-12-18 ENCOUNTER — OFFICE VISIT (OUTPATIENT)
Dept: URGENT CARE | Facility: PHYSICIAN GROUP | Age: 32
End: 2017-12-18
Payer: COMMERCIAL

## 2017-12-18 VITALS
RESPIRATION RATE: 16 BRPM | HEIGHT: 75 IN | WEIGHT: 233 LBS | TEMPERATURE: 98.8 F | BODY MASS INDEX: 28.97 KG/M2 | HEART RATE: 78 BPM | DIASTOLIC BLOOD PRESSURE: 80 MMHG | OXYGEN SATURATION: 98 % | SYSTOLIC BLOOD PRESSURE: 128 MMHG

## 2017-12-18 DIAGNOSIS — R09.81 CONGESTION OF NASAL SINUS: ICD-10-CM

## 2017-12-18 DIAGNOSIS — J22 ACUTE LOWER RESPIRATORY INFECTION: Primary | ICD-10-CM

## 2017-12-18 DIAGNOSIS — J98.01 BRONCHOSPASM, ACUTE: ICD-10-CM

## 2017-12-18 PROCEDURE — 99214 OFFICE O/P EST MOD 30 MIN: CPT | Performed by: PHYSICIAN ASSISTANT

## 2017-12-18 RX ORDER — AZITHROMYCIN 250 MG/1
TABLET, FILM COATED ORAL
Qty: 6 TAB | Refills: 0 | Status: SHIPPED | OUTPATIENT
Start: 2017-12-18 | End: 2018-02-26

## 2017-12-18 RX ORDER — ALBUTEROL SULFATE 0.63 MG/3ML
0.63 SOLUTION RESPIRATORY (INHALATION) EVERY 4 HOURS PRN
Qty: 75 ML | Refills: 1 | Status: SHIPPED | OUTPATIENT
Start: 2017-12-18 | End: 2018-12-09

## 2017-12-18 RX ORDER — ALBUTEROL SULFATE 90 UG/1
2 AEROSOL, METERED RESPIRATORY (INHALATION) EVERY 6 HOURS PRN
Qty: 8.5 G | Refills: 0 | Status: SHIPPED | OUTPATIENT
Start: 2017-12-18 | End: 2018-04-14 | Stop reason: SDUPTHER

## 2017-12-18 ASSESSMENT — ENCOUNTER SYMPTOMS: COUGH: 1

## 2017-12-19 NOTE — PROGRESS NOTES
Subjective:      Roge Meade is a 32 y.o. male who presents with Cough (X 4 days chest congestion, )    PMH:  has a past medical history of Knee joint cyst (2002).  MEDS:   Current Outpatient Prescriptions:   •  losartan (COZAAR) 50 MG Tab, Take 1 Tab by mouth every day., Disp: 30 Tab, Rfl: 11  •  triamcinolone acetonide (KENALOG) 0.1 % Ointment, Apply 1 Application to affected area(s) 2 times a day., Disp: 1 Tube, Rfl: 0  ALLERGIES:   Allergies   Allergen Reactions   • Sulfa Drugs Anaphylaxis     SURGHX:   Past Surgical History:   Procedure Laterality Date   • CYST EXCISION Right 2002    knee   • FASCIOTOMY Bilateral 2001    secondary to calf hypertrophy     SOCHX:  reports that he has never smoked. He has never used smokeless tobacco. He reports that he drinks about 5.4 oz of alcohol per week . He reports that he does not use drugs.  FH: family history includes Alcohol/Drug in his father; Asthma in his son; Cancer in his father; Diabetes in his mother; Hypertension in his father; No Known Problems in his daughter; Psychiatry in his sister; Stroke in his mother. Reviewed with patient/family. Not pertinent to this complaint.            Cough   This is a new problem. The current episode started in the past 7 days. The problem has been gradually worsening. The problem occurs every few minutes. The cough is productive of sputum. Associated symptoms include ear pain, a fever, headaches, nasal congestion, postnasal drip and rhinorrhea. Pertinent negatives include no sore throat, shortness of breath or wheezing. He has tried body position changes, OTC cough suppressant and rest for the symptoms. The treatment provided no relief. His past medical history is significant for bronchitis.       Review of Systems   Constitutional: Positive for fever.   HENT: Positive for congestion, ear pain, postnasal drip, rhinorrhea and sinus pain. Negative for sore throat.    Respiratory: Positive for cough and sputum production.  "Negative for shortness of breath and wheezing.    Neurological: Positive for headaches.   All other systems reviewed and are negative.         Objective:     /80   Pulse 78   Temp 37.1 °C (98.8 °F)   Resp 16   Ht 1.905 m (6' 3\")   Wt 105.7 kg (233 lb)   SpO2 98%   BMI 29.12 kg/m²      Physical Exam   Constitutional: He is oriented to person, place, and time. He appears well-developed and well-nourished. No distress.   HENT:   Head: Normocephalic and atraumatic.   Right Ear: Tympanic membrane normal.   Left Ear: Tympanic membrane normal.   Nose: Mucosal edema and rhinorrhea present.   Mouth/Throat: Uvula is midline. Posterior oropharyngeal erythema present. No posterior oropharyngeal edema.   Eyes: Conjunctivae and EOM are normal. Pupils are equal, round, and reactive to light.   Neck: Normal range of motion. Neck supple.   Cardiovascular: Normal rate, regular rhythm and normal heart sounds.    Pulmonary/Chest: Effort normal. He has no decreased breath sounds. He has wheezes. He has rhonchi. He has no rales.   Abdominal: Soft.   Musculoskeletal: Normal range of motion.   Lymphadenopathy:     He has no cervical adenopathy.   Neurological: He is alert and oriented to person, place, and time. Gait normal.   Skin: Skin is warm and dry. Capillary refill takes less than 2 seconds.   Psychiatric: He has a normal mood and affect.   Nursing note and vitals reviewed.          Assessment/Plan:     1. Acute lower respiratory infection  Hydrocod Polst-CPM Polst ER (TUSSIONEX) 10-8 MG/5ML Suspension Extended Release    azithromycin (ZITHROMAX) 250 MG Tab    albuterol 108 (90 Base) MCG/ACT Aero Soln inhalation aerosol    albuterol (ACCUNEB) 0.63 MG/3ML nebulizer solution   2. Congestion of nasal sinus  Hydrocod Polst-CPM Polst ER (TUSSIONEX) 10-8 MG/5ML Suspension Extended Release    azithromycin (ZITHROMAX) 250 MG Tab    albuterol 108 (90 Base) MCG/ACT Aero Soln inhalation aerosol    albuterol (ACCUNEB) 0.63 MG/3ML " nebulizer solution   3. Bronchospasm, acute  albuterol 108 (90 Base) MCG/ACT Aero Soln inhalation aerosol    albuterol (ACCUNEB) 0.63 MG/3ML nebulizer solution     Discussed that I felt this was viral in nature. Did not see any evidence of a bacterial process. Discussed natural progression and sx care.    Contingent antibiotic prescription given to patient to fill upon meeting criteria of guidelines discussed.     PT instructed not to drive or operate heavy machinery or drink alcohol while taking this medication because it contains either narcotic/benzodiazepines and causes drowsiness. PT verbalized understanding of these instructions.     PT should follow up with PCP in 1-2 days for re-evaluation if symptoms have not improved.  Discussed red flags and reasons to return to UC or ED.  Pt and/or family verbalized understanding of diagnosis and follow up instructions and declined informational handout on diagnosis.  PT discharged.

## 2017-12-24 ASSESSMENT — ENCOUNTER SYMPTOMS
SHORTNESS OF BREATH: 0
FEVER: 1
HEADACHES: 1
SORE THROAT: 0
RHINORRHEA: 1
SINUS PAIN: 1
WHEEZING: 0
SPUTUM PRODUCTION: 1

## 2018-02-26 ENCOUNTER — HOSPITAL ENCOUNTER (OUTPATIENT)
Dept: LAB | Facility: MEDICAL CENTER | Age: 33
End: 2018-02-26
Attending: NURSE PRACTITIONER
Payer: COMMERCIAL

## 2018-02-26 ENCOUNTER — OFFICE VISIT (OUTPATIENT)
Dept: MEDICAL GROUP | Facility: MEDICAL CENTER | Age: 33
End: 2018-02-26
Payer: COMMERCIAL

## 2018-02-26 VITALS
BODY MASS INDEX: 29.97 KG/M2 | WEIGHT: 241 LBS | HEART RATE: 75 BPM | TEMPERATURE: 97.7 F | RESPIRATION RATE: 16 BRPM | DIASTOLIC BLOOD PRESSURE: 76 MMHG | OXYGEN SATURATION: 99 % | HEIGHT: 75 IN | SYSTOLIC BLOOD PRESSURE: 138 MMHG

## 2018-02-26 DIAGNOSIS — Z00.00 ROUTINE GENERAL MEDICAL EXAMINATION AT A HEALTH CARE FACILITY: ICD-10-CM

## 2018-02-26 DIAGNOSIS — R20.2 PARESTHESIA OF BOTH HANDS: ICD-10-CM

## 2018-02-26 DIAGNOSIS — R10.9 FLANK PAIN: ICD-10-CM

## 2018-02-26 DIAGNOSIS — E66.9 OBESITY (BMI 30-39.9): ICD-10-CM

## 2018-02-26 LAB
ALBUMIN SERPL BCP-MCNC: 4.7 G/DL (ref 3.2–4.9)
ALBUMIN/GLOB SERPL: 1.7 G/DL
ALP SERPL-CCNC: 49 U/L (ref 30–99)
ALT SERPL-CCNC: 51 U/L (ref 2–50)
ANION GAP SERPL CALC-SCNC: 7 MMOL/L (ref 0–11.9)
AST SERPL-CCNC: 27 U/L (ref 12–45)
BILIRUB SERPL-MCNC: 0.6 MG/DL (ref 0.1–1.5)
BUN SERPL-MCNC: 16 MG/DL (ref 8–22)
CALCIUM SERPL-MCNC: 9.3 MG/DL (ref 8.5–10.5)
CHLORIDE SERPL-SCNC: 105 MMOL/L (ref 96–112)
CO2 SERPL-SCNC: 26 MMOL/L (ref 20–33)
CREAT SERPL-MCNC: 1.07 MG/DL (ref 0.5–1.4)
GLOBULIN SER CALC-MCNC: 2.7 G/DL (ref 1.9–3.5)
GLUCOSE SERPL-MCNC: 116 MG/DL (ref 65–99)
POTASSIUM SERPL-SCNC: 4.3 MMOL/L (ref 3.6–5.5)
PROT SERPL-MCNC: 7.4 G/DL (ref 6–8.2)
SODIUM SERPL-SCNC: 138 MMOL/L (ref 135–145)
TSH SERPL DL<=0.005 MIU/L-ACNC: 2.13 UIU/ML (ref 0.38–5.33)

## 2018-02-26 PROCEDURE — 99214 OFFICE O/P EST MOD 30 MIN: CPT | Performed by: NURSE PRACTITIONER

## 2018-02-26 PROCEDURE — 84443 ASSAY THYROID STIM HORMONE: CPT

## 2018-02-26 PROCEDURE — 80053 COMPREHEN METABOLIC PANEL: CPT

## 2018-02-26 PROCEDURE — 36415 COLL VENOUS BLD VENIPUNCTURE: CPT

## 2018-02-26 RX ORDER — TIZANIDINE 4 MG/1
4 TABLET ORAL EVERY 6 HOURS PRN
Qty: 30 TAB | Refills: 3 | Status: SHIPPED | OUTPATIENT
Start: 2018-02-26 | End: 2019-03-15

## 2018-02-26 ASSESSMENT — ENCOUNTER SYMPTOMS
TINGLING: 1
NERVOUS/ANXIOUS: 1
BACK PAIN: 1

## 2018-02-26 ASSESSMENT — PATIENT HEALTH QUESTIONNAIRE - PHQ9: CLINICAL INTERPRETATION OF PHQ2 SCORE: 0

## 2018-02-26 NOTE — PROGRESS NOTES
Subjective:      Roge Meade is a 32 y.o. male who presents with Other (lower right back stabing pain and extrange sensation and off)        CC: Patient here today for complaints of right flank pain and tingling of his fingers.    HPI Roge Meade      1. Flank pain  Patient apparently was having many problems with urination and abdomen for which he was seen by both urology and gastroenterology last year. He states he had workup including cystoscopy and imaging studies but nothing significant was found. He was treated for prostatitis. He states the only problem he is having now is some right flank pain. He states it started after all of his other issues, in November of last year. He described it as a stabbing type pain that would last for about 30 seconds. However, over the last 2 weeks it has become more prevalent and will occur 4-5 times a day and last for about 30 seconds each. He is going to a chiropractor but is concerned about his kidneys since it is in the right flank. The pain does not radiate. Nothing seems to make it better. He denies hematuria or dysuria.    2. Paresthesia of both hands  Patient states he also has noticed some tingling of the fingers of his hands bilaterally for the past 2 months. It is usually worse when he wakes up in the morning and then will improve lately he feels like the tingling is happening throughout the day as well. He denies color changes or temperature changes of his hands.    3. Routine general medical examination at a health care facility  Patient is requesting some routine lab work.      Social History   Substance Use Topics   • Smoking status: Never Smoker   • Smokeless tobacco: Never Used      Comment: continue to avoid   • Alcohol use 5.4 oz/week     9 Shots of liquor per week     Current Outpatient Prescriptions   Medication Sig Dispense Refill   • tizanidine (ZANAFLEX) 4 MG Tab Take 1 Tab by mouth every 6 hours as needed. 30 Tab 3   • albuterol 108 (90  "Base) MCG/ACT Aero Soln inhalation aerosol Inhale 2 Puffs by mouth every 6 hours as needed for Shortness of Breath. 8.5 g 0   • albuterol (ACCUNEB) 0.63 MG/3ML nebulizer solution 3 mL by Nebulization route every four hours as needed for Shortness of Breath. 75 mL 1   • losartan (COZAAR) 50 MG Tab Take 1 Tab by mouth every day. 30 Tab 11   • triamcinolone acetonide (KENALOG) 0.1 % Ointment Apply 1 Application to affected area(s) 2 times a day. 1 Tube 0     No current facility-administered medications for this visit.      Past Medical History:   Diagnosis Date   • Knee joint cyst 2002     Family History   Problem Relation Age of Onset   • Stroke Mother    • Diabetes Mother    • Hypertension Father    • Cancer Father      bladder   • Alcohol/Drug Father    • Psychiatry Sister      eating disorder   • Asthma Son    • No Known Problems Daughter        Review of Systems   Musculoskeletal: Positive for back pain.   Neurological: Positive for tingling.   Psychiatric/Behavioral: The patient is nervous/anxious.    All other systems reviewed and are negative.         Objective:     /76   Pulse 75   Temp 36.5 °C (97.7 °F)   Resp 16   Ht 1.905 m (6' 3\")   Wt 109.3 kg (241 lb)   SpO2 99%   BMI 30.12 kg/m²      Physical Exam   Constitutional: He is oriented to person, place, and time. He appears well-developed and well-nourished. No distress.   HENT:   Head: Normocephalic and atraumatic.   Right Ear: External ear normal.   Left Ear: External ear normal.   Nose: Nose normal.   Mouth/Throat: Oropharynx is clear and moist.   Eyes: Conjunctivae are normal. Right eye exhibits no discharge. Left eye exhibits no discharge.   Neck: Normal range of motion. Neck supple. No tracheal deviation present. No thyromegaly present.   Cardiovascular: Normal rate, regular rhythm and normal heart sounds.    No murmur heard.  Pulmonary/Chest: Effort normal and breath sounds normal. No respiratory distress. He has no wheezes. He has no " rales.   Abdominal:   Patient points to his right flank as to where the pain occurs but there is no tenderness to palpation.   Lymphadenopathy:     He has no cervical adenopathy.   Neurological: He is alert and oriented to person, place, and time. Coordination normal.   Patient able to feel touch with his fingers bilaterally but he reports some numbness and tingling of all the fingers of both hands.   Skin: Skin is warm and dry. No rash noted. He is not diaphoretic. No erythema.   Psychiatric: His behavior is normal. Judgment and thought content normal. His mood appears anxious.   Nursing note and vitals reviewed.              Assessment/Plan:     1. Flank pain  Patient advised this could be musculoskeletal but because over the pain is and would like to get a CT to rule out renal colic. He may continue with his chiropractor and he was given a muscle relaxer as well. I told him if the pain becomes persistent or worsens, he should go to the emergency room or follow up with his urologist.  - CT-RENAL COLIC EVALUATION(A/P W/O); Future  - tizanidine (ZANAFLEX) 4 MG Tab; Take 1 Tab by mouth every 6 hours as needed.  Dispense: 30 Tab; Refill: 3    2. Paresthesia of both hands  Patient advised to use wrist splints in the evening and I advised him that it is not uncommon for people to wake up with tingling of the fingers because of the way they sleep with her hands. However, if it is occurring throughout the day as well he should wear the splints more often and I will refer him to physiatry for EMG studies if it does not improve.  - COMP METABOLIC PANEL; Future  - TSH; Future    3. Routine general medical examination at a health care facility    - COMP METABOLIC PANEL; Future  - TSH; Future    4. Obesity (BMI 30-39.9)    - Patient identified as having weight management issue.  Appropriate orders and counseling given.

## 2018-02-27 DIAGNOSIS — R73.9 BLOOD GLUCOSE ELEVATED: ICD-10-CM

## 2018-04-14 ENCOUNTER — TELEPHONE (OUTPATIENT)
Dept: MEDICAL GROUP | Age: 33
End: 2018-04-14

## 2018-04-14 DIAGNOSIS — J45.30 MILD PERSISTENT ASTHMA, UNSPECIFIED WHETHER COMPLICATED: ICD-10-CM

## 2018-04-14 RX ORDER — ALBUTEROL SULFATE 90 UG/1
2 AEROSOL, METERED RESPIRATORY (INHALATION) EVERY 6 HOURS PRN
Qty: 8.5 G | Refills: 1 | Status: SHIPPED | OUTPATIENT
Start: 2018-04-14 | End: 2018-12-09

## 2018-04-14 NOTE — TELEPHONE ENCOUNTER
Patient called to refill albuterol inhaler. Patient stated that he travelled to [place] and forgets to bring his albuterol inhaler. He stated that he has known asthma. He had asthma symptoms last night and needed to use inhaler. He reported that he is doing okay now. He can speak the whole sentence without shortness of breath over the phone. Albuterol inhaler was sent to pharmacy in [place] as patient located. He is advised to go to ER or urgent care over there if his symptoms do not improve with albuterol inhaler. Patient understands and agrees with the plan.    - Discussed ED precautions with patient: seek emergency evaluation for symptoms including but not limited to: worsening symptoms or developing any new symptoms, crushing chest pain, chest pain associated with difficulty breathing, nausea, or sweats, heart rate irregular or too fast to count.   - Any change or worsening of signs or symptoms, patient encouraged to follow-up or report to emergency room for further evaluation. Patient understands and agrees      Zenaida Carroll M.D.

## 2018-06-22 RX ORDER — LOSARTAN POTASSIUM 50 MG/1
50 TABLET ORAL DAILY
Qty: 90 TAB | Refills: 0 | Status: SHIPPED | OUTPATIENT
Start: 2018-06-22 | End: 2018-10-18 | Stop reason: SDUPTHER

## 2018-06-23 ENCOUNTER — OFFICE VISIT (OUTPATIENT)
Dept: URGENT CARE | Facility: PHYSICIAN GROUP | Age: 33
End: 2018-06-23
Payer: COMMERCIAL

## 2018-06-23 ENCOUNTER — HOSPITAL ENCOUNTER (OUTPATIENT)
Dept: RADIOLOGY | Facility: MEDICAL CENTER | Age: 33
End: 2018-06-23
Attending: FAMILY MEDICINE
Payer: COMMERCIAL

## 2018-06-23 VITALS
HEART RATE: 101 BPM | RESPIRATION RATE: 14 BRPM | TEMPERATURE: 98.6 F | BODY MASS INDEX: 29.97 KG/M2 | HEIGHT: 75 IN | DIASTOLIC BLOOD PRESSURE: 100 MMHG | SYSTOLIC BLOOD PRESSURE: 142 MMHG | WEIGHT: 241 LBS | OXYGEN SATURATION: 96 %

## 2018-06-23 DIAGNOSIS — N50.811 PAIN IN RIGHT TESTICLE: ICD-10-CM

## 2018-06-23 DIAGNOSIS — R10.9 RIGHT FLANK PAIN: ICD-10-CM

## 2018-06-23 DIAGNOSIS — N45.1 EPIDIDYMITIS: ICD-10-CM

## 2018-06-23 LAB
APPEARANCE UR: CLEAR
BILIRUB UR STRIP-MCNC: NEGATIVE MG/DL
COLOR UR AUTO: NORMAL
GLUCOSE UR STRIP.AUTO-MCNC: NEGATIVE MG/DL
KETONES UR STRIP.AUTO-MCNC: NEGATIVE MG/DL
LEUKOCYTE ESTERASE UR QL STRIP.AUTO: NEGATIVE
NITRITE UR QL STRIP.AUTO: NEGATIVE
PH UR STRIP.AUTO: 7 [PH] (ref 5–8)
PROT UR QL STRIP: NEGATIVE MG/DL
RBC UR QL AUTO: NEGATIVE
SP GR UR STRIP.AUTO: 1.01
UROBILINOGEN UR STRIP-MCNC: NEGATIVE MG/DL

## 2018-06-23 PROCEDURE — 74176 CT ABD & PELVIS W/O CONTRAST: CPT

## 2018-06-23 PROCEDURE — 99214 OFFICE O/P EST MOD 30 MIN: CPT | Performed by: FAMILY MEDICINE

## 2018-06-23 PROCEDURE — 81002 URINALYSIS NONAUTO W/O SCOPE: CPT | Performed by: FAMILY MEDICINE

## 2018-06-23 PROCEDURE — 76870 US EXAM SCROTUM: CPT

## 2018-06-23 RX ORDER — HYDROCODONE BITARTRATE AND ACETAMINOPHEN 5; 325 MG/1; MG/1
1-2 TABLET ORAL EVERY 6 HOURS PRN
Qty: 10 TAB | Refills: 0 | Status: SHIPPED | OUTPATIENT
Start: 2018-06-23 | End: 2018-06-28

## 2018-06-23 RX ORDER — DOXYCYCLINE HYCLATE 100 MG
100 TABLET ORAL 2 TIMES DAILY
Qty: 20 TAB | Refills: 0 | Status: SHIPPED | OUTPATIENT
Start: 2018-06-23 | End: 2018-07-03

## 2018-06-23 RX ORDER — KETOROLAC TROMETHAMINE 30 MG/ML
60 INJECTION, SOLUTION INTRAMUSCULAR; INTRAVENOUS ONCE
Status: COMPLETED | OUTPATIENT
Start: 2018-06-23 | End: 2018-06-23

## 2018-06-23 RX ADMIN — KETOROLAC TROMETHAMINE 60 MG: 30 INJECTION, SOLUTION INTRAMUSCULAR; INTRAVENOUS at 14:54

## 2018-06-26 ASSESSMENT — ENCOUNTER SYMPTOMS
DIARRHEA: 0
CHILLS: 0
EYE DISCHARGE: 0
WEIGHT LOSS: 0
NAUSEA: 0
EYE REDNESS: 0
VOMITING: 0

## 2018-06-26 NOTE — PROGRESS NOTES
"Subjective:      Roge Meade is a 33 y.o. male who presents with Back Pain (rib muscle spasm)            1 day right flank and right testicle pain. Waxing and waning. At times severe. No hematuria. No dysuria. No fever. No PMH kidney stone or UTI. Possible PMH prostatitis and has seen urology in past. No abdominal pain. Unable to find a comfortable position. No radiation to extremity. Min relief with OTC medications. No other aggravating or alleviating factors.          Review of Systems   Constitutional: Negative for chills and weight loss.   Eyes: Negative for discharge and redness.   Gastrointestinal: Negative for diarrhea, nausea and vomiting.   Skin: Negative for itching and rash.     .  Medications, Allergies, and current problem list reviewed today in Epic       Objective:     /100   Pulse (!) 101   Temp 37 °C (98.6 °F)   Resp 14   Ht 1.905 m (6' 3\")   Wt 109.3 kg (241 lb)   SpO2 96%   BMI 30.12 kg/m²      Physical Exam   Constitutional: He appears well-developed and well-nourished.   Appears in pain   HENT:   Head: Normocephalic and atraumatic.   Eyes: Conjunctivae are normal.   Neck: Neck supple.   Cardiovascular: Normal rate, regular rhythm and normal heart sounds.    Pulmonary/Chest: Effort normal and breath sounds normal.   Genitourinary:   Genitourinary Comments: Right testicle, mildly tender posterior aspect. No mass. No hernia   Lymphadenopathy:     He has no cervical adenopathy.   Neurological:   Speech is clear. Patient is appropriate and cooperative.     Skin: Skin is warm and dry. No rash noted.               Assessment/Plan:   UA reviewed  CT reviewed  US reviewed    1. Right flank pain    - POCT Urinalysis  - ketorolac (TORADOL) injection 60 mg; 2 mL by Intramuscular route Once.  - CT-RENAL COLIC EVALUATION(A/P W/O); Future  - HYDROcodone-acetaminophen (NORCO) 5-325 MG Tab per tablet; Take 1-2 Tabs by mouth every 6 hours as needed for up to 5 days.  Dispense: 10 Tab; Refill: " 0  - CONSENT FOR OPIATE PRESCRIPTION    2. Pain in right testicle    - POCT Urinalysis  - [id number]; Future  - HYDROcodone-acetaminophen (NORCO) 5-325 MG Tab per tablet; Take 1-2 Tabs by mouth every 6 hours as needed for up to 5 days.  Dispense: 10 Tab; Refill: 0  - CONSENT FOR OPIATE PRESCRIPTION    3. Epididymitis    - doxycycline (VIBRAMYCIN) 100 MG Tab; Take 1 Tab by mouth 2 times a day for 10 days.  Dispense: 20 Tab; Refill: 0  - HYDROcodone-acetaminophen (NORCO) 5-325 MG Tab per tablet; Take 1-2 Tabs by mouth every 6 hours as needed for up to 5 days.  Dispense: 10 Tab; Refill: 0  - CONSENT FOR OPIATE PRESCRIPTION    Differential diagnosis, natural history, supportive care, and indications for immediate follow-up discussed at length.     I'm not confident epididymitis explains all of these symptoms however will treat and recommend f/u with pcp.     risk vs benefit of medication(s) discussed at length

## 2018-10-18 RX ORDER — LOSARTAN POTASSIUM 50 MG/1
50 TABLET ORAL DAILY
Qty: 90 TAB | Refills: 0 | Status: SHIPPED | OUTPATIENT
Start: 2018-10-18 | End: 2019-01-21 | Stop reason: SDUPTHER

## 2018-12-09 ENCOUNTER — OFFICE VISIT (OUTPATIENT)
Dept: URGENT CARE | Facility: PHYSICIAN GROUP | Age: 33
End: 2018-12-09
Payer: COMMERCIAL

## 2018-12-09 VITALS
SYSTOLIC BLOOD PRESSURE: 150 MMHG | WEIGHT: 245 LBS | RESPIRATION RATE: 20 BRPM | HEIGHT: 75 IN | DIASTOLIC BLOOD PRESSURE: 88 MMHG | OXYGEN SATURATION: 96 % | TEMPERATURE: 97.2 F | HEART RATE: 96 BPM | BODY MASS INDEX: 30.46 KG/M2

## 2018-12-09 DIAGNOSIS — J22 LRTI (LOWER RESPIRATORY TRACT INFECTION): ICD-10-CM

## 2018-12-09 PROCEDURE — 99214 OFFICE O/P EST MOD 30 MIN: CPT | Performed by: PHYSICIAN ASSISTANT

## 2018-12-09 RX ORDER — DOXYCYCLINE HYCLATE 100 MG
100 TABLET ORAL 2 TIMES DAILY
Qty: 20 TAB | Refills: 0 | Status: SHIPPED | OUTPATIENT
Start: 2018-12-09 | End: 2019-03-15

## 2018-12-09 RX ORDER — ALBUTEROL SULFATE 90 UG/1
2 AEROSOL, METERED RESPIRATORY (INHALATION) EVERY 6 HOURS PRN
Qty: 8.5 G | Refills: 0 | Status: SHIPPED | OUTPATIENT
Start: 2018-12-09 | End: 2021-11-10

## 2018-12-09 ASSESSMENT — ENCOUNTER SYMPTOMS
CONSTITUTIONAL NEGATIVE: 1
SORE THROAT: 1
SPUTUM PRODUCTION: 0
CARDIOVASCULAR NEGATIVE: 1
SHORTNESS OF BREATH: 1
SWOLLEN GLANDS: 1
ABDOMINAL PAIN: 0
HEADACHES: 1
COUGH: 1
MUSCULOSKELETAL NEGATIVE: 1
CONSTIPATION: 0
VOMITING: 0
TROUBLE SWALLOWING: 1
DIARRHEA: 0
WHEEZING: 0
DIZZINESS: 0

## 2018-12-09 NOTE — PROGRESS NOTES
Subjective:      Roge Meade is a 33 y.o. male who presents with Pharyngitis (Sore throat, cough, burning in chest, congestion x2.5weeks )            Pharyngitis    This is a new problem. The current episode started in the past 7 days. The problem has been unchanged. There has been no fever. The fever has been present for less than 1 day. Associated symptoms include congestion, coughing, ear pain, headaches, shortness of breath, swollen glands and trouble swallowing. Pertinent negatives include no abdominal pain, diarrhea or vomiting. He has tried acetaminophen for the symptoms. The treatment provided mild relief.   Wife and daughter diagnosed with bacterial pneumonia.  He denies any respiratory complaints, mainly upper respiratory/sinus congestion.    PMH:  has a past medical history of Knee joint cyst (2002).  MEDS:   Current Outpatient Prescriptions:   •  losartan (COZAAR) 50 MG Tab, Take 1 Tab by mouth every day., Disp: 90 Tab, Rfl: 0  •  albuterol 108 (90 Base) MCG/ACT Aero Soln inhalation aerosol, Inhale 2 Puffs by mouth every 6 hours as needed for Shortness of Breath. (Patient not taking: Reported on 12/9/2018), Disp: 8.5 g, Rfl: 1  •  tizanidine (ZANAFLEX) 4 MG Tab, Take 1 Tab by mouth every 6 hours as needed. (Patient not taking: Reported on 12/9/2018), Disp: 30 Tab, Rfl: 3  •  albuterol (ACCUNEB) 0.63 MG/3ML nebulizer solution, 3 mL by Nebulization route every four hours as needed for Shortness of Breath. (Patient not taking: Reported on 12/9/2018), Disp: 75 mL, Rfl: 1  •  triamcinolone acetonide (KENALOG) 0.1 % Ointment, Apply 1 Application to affected area(s) 2 times a day. (Patient not taking: Reported on 12/9/2018), Disp: 1 Tube, Rfl: 0  ALLERGIES:   Allergies   Allergen Reactions   • Sulfa Drugs Anaphylaxis     SURGHX:   Past Surgical History:   Procedure Laterality Date   • CYST EXCISION Right 2002    knee   • FASCIOTOMY Bilateral 2001    secondary to calf hypertrophy     SOCHX:  reports that  "he has never smoked. He has never used smokeless tobacco. He reports that he drinks about 5.4 oz of alcohol per week . He reports that he does not use drugs.  FH: family history includes Alcohol/Drug in his father; Asthma in his son; Cancer in his father; Diabetes in his mother; Hypertension in his father; No Known Problems in his daughter; Psychiatry in his sister; Stroke in his mother.    Review of Systems   Constitutional: Negative.    HENT: Positive for congestion, ear pain, sore throat and trouble swallowing.    Respiratory: Positive for cough and shortness of breath. Negative for sputum production and wheezing.    Cardiovascular: Negative.    Gastrointestinal: Negative for abdominal pain, constipation, diarrhea and vomiting.   Musculoskeletal: Negative.    Neurological: Positive for headaches. Negative for dizziness.       Medications, Allergies, and current problem list reviewed today in Epic     Objective:     /88   Pulse 96   Temp 36.2 °C (97.2 °F) (Temporal)   Resp 20   Ht 1.905 m (6' 3\")   Wt 111.1 kg (245 lb)   SpO2 96%   BMI 30.62 kg/m²      Physical Exam   Constitutional: He is oriented to person, place, and time. He appears well-developed and well-nourished. No distress.   HENT:   Head: Normocephalic and atraumatic.   Right Ear: Hearing, tympanic membrane and external ear normal.   Left Ear: Hearing, tympanic membrane and external ear normal.   Nose: Right sinus exhibits maxillary sinus tenderness. Left sinus exhibits maxillary sinus tenderness.   Mouth/Throat: Normal dentition. No dental caries. Posterior oropharyngeal erythema present. No oropharyngeal exudate.   Eyes: Conjunctivae are normal. Right eye exhibits no discharge. Left eye exhibits no discharge.   Neck: Normal range of motion. Neck supple.   Cardiovascular: Normal rate, regular rhythm and normal heart sounds.    Pulmonary/Chest: Effort normal and breath sounds normal. No respiratory distress. He has no wheezes. He has no " rales.   Lymphadenopathy:        Head (right side): Submandibular adenopathy present.        Head (left side): Submandibular adenopathy present.     He has no cervical adenopathy.        Right cervical: No superficial cervical adenopathy present.       Left cervical: No superficial cervical adenopathy present.   Neurological: He is alert and oriented to person, place, and time.   Skin: Skin is warm and dry. He is not diaphoretic. No cyanosis. Nails show no clubbing.   Psychiatric: He has a normal mood and affect. His behavior is normal. Judgment and thought content normal.   Nursing note and vitals reviewed.              Assessment/Plan:     1. LRTI (lower respiratory tract infection)  albuterol 108 (90 Base) MCG/ACT Aero Soln inhalation aerosol    doxycycline (VIBRAMYCIN) 100 MG Tab     PO2 adequate, lungs clear bilaterally.  No obvious signs of pneumonia at this time.  He will be treated empirically based on exposure from both wife and daughter.  He has upper respiratory symptoms as well.  Take full course of antibiotic  Tylenol and Motrin for fever and pain  OTC meds as discussed including oral decongestant if tolerated  Increase fluids and rest  Nasal spray, nasal wash, Samreen pot    Return to clinic or go to ED if symptoms worsen or persist. Indications for ED discussed at length. Patient voices understanding. Follow-up with your primary care provider in 3-5 days. Red flags discussed. All side effects of medication discussed including allergic response, GI upset, tendon injury, etc.    Please note that this dictation was created using voice recognition software. I have made every reasonable attempt to correct obvious errors, but I expect that there are errors of grammar and possibly content that I did not discover before finalizing the note.

## 2019-01-21 RX ORDER — LOSARTAN POTASSIUM 50 MG/1
50 TABLET ORAL DAILY
Qty: 90 TAB | Refills: 0 | Status: SHIPPED | OUTPATIENT
Start: 2019-01-21 | End: 2019-01-25 | Stop reason: SDUPTHER

## 2019-01-23 ENCOUNTER — TELEPHONE (OUTPATIENT)
Dept: MEDICAL GROUP | Facility: MEDICAL CENTER | Age: 34
End: 2019-01-23

## 2019-01-25 ENCOUNTER — TELEPHONE (OUTPATIENT)
Dept: MEDICAL GROUP | Facility: MEDICAL CENTER | Age: 34
End: 2019-01-25

## 2019-01-25 RX ORDER — LOSARTAN POTASSIUM 50 MG/1
50 TABLET ORAL DAILY
Qty: 90 TAB | Refills: 0 | Status: SHIPPED | OUTPATIENT
Start: 2019-01-25 | End: 2019-03-15

## 2019-01-25 NOTE — TELEPHONE ENCOUNTER
prescription for losartan needs to be sent to [facility].....[facility]

## 2019-03-15 ENCOUNTER — OFFICE VISIT (OUTPATIENT)
Dept: MEDICAL GROUP | Facility: MEDICAL CENTER | Age: 34
End: 2019-03-15
Payer: COMMERCIAL

## 2019-03-15 VITALS
HEART RATE: 88 BPM | TEMPERATURE: 98.8 F | SYSTOLIC BLOOD PRESSURE: 142 MMHG | BODY MASS INDEX: 31.08 KG/M2 | DIASTOLIC BLOOD PRESSURE: 88 MMHG | HEIGHT: 75 IN | RESPIRATION RATE: 16 BRPM | WEIGHT: 250 LBS | OXYGEN SATURATION: 96 %

## 2019-03-15 DIAGNOSIS — M54.50 BILATERAL LOW BACK PAIN WITHOUT SCIATICA, UNSPECIFIED CHRONICITY: ICD-10-CM

## 2019-03-15 DIAGNOSIS — J45.990 EXERCISE-INDUCED ASTHMA: ICD-10-CM

## 2019-03-15 DIAGNOSIS — R73.9 BLOOD GLUCOSE ELEVATED: ICD-10-CM

## 2019-03-15 DIAGNOSIS — I10 ESSENTIAL HYPERTENSION: ICD-10-CM

## 2019-03-15 PROBLEM — Z87.438 H/O PROSTATITIS: Status: RESOLVED | Noted: 2017-09-06 | Resolved: 2019-03-15

## 2019-03-15 PROCEDURE — 99214 OFFICE O/P EST MOD 30 MIN: CPT | Performed by: NURSE PRACTITIONER

## 2019-03-15 RX ORDER — LOSARTAN POTASSIUM 100 MG/1
100 TABLET ORAL DAILY
Qty: 90 TAB | Refills: 3 | Status: SHIPPED | OUTPATIENT
Start: 2019-03-15 | End: 2020-05-13 | Stop reason: SDUPTHER

## 2019-03-15 RX ORDER — LOSARTAN POTASSIUM 50 MG/1
50 TABLET ORAL DAILY
Qty: 90 TAB | Refills: 3 | Status: CANCELLED | OUTPATIENT
Start: 2019-03-15

## 2019-03-15 ASSESSMENT — PATIENT HEALTH QUESTIONNAIRE - PHQ9: CLINICAL INTERPRETATION OF PHQ2 SCORE: 0

## 2019-03-15 ASSESSMENT — ENCOUNTER SYMPTOMS: BACK PAIN: 1

## 2019-03-15 NOTE — PROGRESS NOTES
Subjective:      Roge Meade is a 33 y.o. male who presents with Medication Refill (Losartan)        CC: Patient here for yearly follow-up on hypertension as well as to discuss his recent back issues and need of lab work.    HPI Roge Meade        1. Essential hypertension  Patient has been on low-dose losartan 50 mg for a few years and believes his blood pressure has been controlled.  However, looking at his blood pressure today and at recent urgent care visits, it has been elevated.  It appears his home readings are sometimes above 90.  He states he is taking his medicine faithfully.  He denies any side effects from the losartan.    2. Blood glucose elevated  Patient's routine blood work from a year ago showed fasting blood sugar of 116.  He denies diabetic symptoms and has no history of diabetes.  His TSH, GFR and chemistry panel was otherwise normal except for barely elevated ALT at 51 with normal abdominal ultrasound in the recent past.    3. Bilateral low back pain without sciatica, unspecified chronicity  Patient reports that he went to a chiropractor for complaints of low back pain and was treated with some sort of procedure which he felt has helped.  He states it still bothers him on occasion but there is no radiculopathy and he currently is asymptomatic.    4. Exercise-induced asthma  Patient states he uses albuterol only before exercise because he states for years he has had issues with mucus and wheezing when he exercises which is mitigated by his albuterol.  Current Outpatient Prescriptions   Medication Sig Dispense Refill   • losartan (COZAAR) 100 MG Tab Take 1 Tab by mouth every day. 90 Tab 3   • albuterol 108 (90 Base) MCG/ACT Aero Soln inhalation aerosol Inhale 2 Puffs by mouth every 6 hours as needed for Shortness of Breath. 8.5 g 0     No current facility-administered medications for this visit.      Social History   Substance Use Topics   • Smoking status: Never Smoker   •  "Smokeless tobacco: Never Used      Comment: continue to avoid   • Alcohol use 5.4 oz/week     9 Shots of liquor per week     Family History   Problem Relation Age of Onset   • Stroke Mother    • Diabetes Mother    • Hypertension Father    • Cancer Father         bladder   • Alcohol/Drug Father    • Psychiatry Sister         eating disorder   • Asthma Son    • No Known Problems Daughter      Past Medical History:   Diagnosis Date   • Knee joint cyst 2002       Review of Systems   Musculoskeletal: Positive for back pain.   All other systems reviewed and are negative.         Objective:     /88 (BP Location: Left arm)   Pulse 88   Temp 37.1 °C (98.8 °F)   Resp 16   Ht 1.905 m (6' 3\")   Wt 113.4 kg (250 lb)   SpO2 96%   BMI 31.25 kg/m²      Physical Exam   Constitutional: He is oriented to person, place, and time. He appears well-developed and well-nourished. No distress.   HENT:   Head: Normocephalic and atraumatic.   Right Ear: External ear normal.   Left Ear: External ear normal.   Nose: Nose normal.   Mouth/Throat: Oropharynx is clear and moist.   Eyes: Conjunctivae are normal. Right eye exhibits no discharge. Left eye exhibits no discharge.   Neck: Normal range of motion. Neck supple. No tracheal deviation present. No thyromegaly present.   Cardiovascular: Normal rate, regular rhythm and normal heart sounds.    No murmur heard.  Pulmonary/Chest: Effort normal and breath sounds normal. No respiratory distress. He has no wheezes. He has no rales.   Abdominal: Soft. Bowel sounds are normal. He exhibits no distension and no mass. There is no tenderness. There is no rebound and no guarding. No hernia.   Lymphadenopathy:     He has no cervical adenopathy.   Neurological: He is alert and oriented to person, place, and time. Coordination normal.   Skin: Skin is warm and dry. No rash noted. He is not diaphoretic. No erythema.   Psychiatric: He has a normal mood and affect. His behavior is normal. Judgment and " thought content normal.   Nursing note and vitals reviewed.              Assessment/Plan:     1. Essential hypertension  Blood pressure has been running elevated from looking at his urgent care and visits here so I am going to increase his losartan to 100 mg.  I explained our goal is 130/80 or less without dizziness or side effects.  He states he is going to be exercising soon so he will monitor his blood pressure and break it in half if necessary in the future.  He will try to lose weight and watch his sodium intake.  - Comp Metabolic Panel; Future  - losartan (COZAAR) 100 MG Tab; Take 1 Tab by mouth every day.  Dispense: 90 Tab; Refill: 3    2. Blood glucose elevated  Blood sugar last year was mildly elevated so I will have him do lab work to check for prediabetes.  - Comp Metabolic Panel; Future  - HEMOGLOBIN A1C; Future    3. Bilateral low back pain without sciatica, unspecified chronicity  Patient feels that his chiropractic treatments have been helping so no further intervention needed.  I did explain to him about possibility of physiatry in the future if symptoms are not improved.    4. Exercise-induced asthma  Patient will continue to use albuterol only as needed.  I will talk to him about possible pulmonary function test in the future.

## 2019-03-20 ENCOUNTER — HOSPITAL ENCOUNTER (OUTPATIENT)
Dept: LAB | Facility: MEDICAL CENTER | Age: 34
End: 2019-03-20
Attending: NURSE PRACTITIONER
Payer: COMMERCIAL

## 2019-03-20 DIAGNOSIS — R73.9 BLOOD GLUCOSE ELEVATED: ICD-10-CM

## 2019-03-20 DIAGNOSIS — I10 ESSENTIAL HYPERTENSION: ICD-10-CM

## 2019-03-20 LAB
ALBUMIN SERPL BCP-MCNC: 4.5 G/DL (ref 3.2–4.9)
ALBUMIN/GLOB SERPL: 1.6 G/DL
ALP SERPL-CCNC: 45 U/L (ref 30–99)
ALT SERPL-CCNC: 88 U/L (ref 2–50)
ANION GAP SERPL CALC-SCNC: 8 MMOL/L (ref 0–11.9)
AST SERPL-CCNC: 53 U/L (ref 12–45)
BILIRUB SERPL-MCNC: 1 MG/DL (ref 0.1–1.5)
BUN SERPL-MCNC: 18 MG/DL (ref 8–22)
CALCIUM SERPL-MCNC: 9.1 MG/DL (ref 8.5–10.5)
CHLORIDE SERPL-SCNC: 105 MMOL/L (ref 96–112)
CO2 SERPL-SCNC: 24 MMOL/L (ref 20–33)
CREAT SERPL-MCNC: 0.97 MG/DL (ref 0.5–1.4)
EST. AVERAGE GLUCOSE BLD GHB EST-MCNC: 114 MG/DL
FASTING STATUS PATIENT QL REPORTED: NORMAL
GLOBULIN SER CALC-MCNC: 2.8 G/DL (ref 1.9–3.5)
GLUCOSE SERPL-MCNC: 88 MG/DL (ref 65–99)
HBA1C MFR BLD: 5.6 % (ref 0–5.6)
POTASSIUM SERPL-SCNC: 3.9 MMOL/L (ref 3.6–5.5)
PROT SERPL-MCNC: 7.3 G/DL (ref 6–8.2)
SODIUM SERPL-SCNC: 137 MMOL/L (ref 135–145)

## 2019-03-20 PROCEDURE — 83036 HEMOGLOBIN GLYCOSYLATED A1C: CPT

## 2019-03-20 PROCEDURE — 80053 COMPREHEN METABOLIC PANEL: CPT

## 2019-03-20 PROCEDURE — 36415 COLL VENOUS BLD VENIPUNCTURE: CPT

## 2019-07-23 ENCOUNTER — TELEPHONE (OUTPATIENT)
Dept: MEDICAL GROUP | Facility: MEDICAL CENTER | Age: 34
End: 2019-07-23

## 2019-07-23 DIAGNOSIS — R79.89 ELEVATED LIVER FUNCTION TESTS: ICD-10-CM

## 2019-07-23 NOTE — TELEPHONE ENCOUNTER
1. Caller Name: Roge Pereiradugo                                        Call Back Number: [phone] (home) [phone] (work)    Pt called he states liver functions were slightly elevated a few month ago and you said to re check it in 3 months and he would like orders for it          Patient approves a detailed voicemail message: N\A

## 2019-07-25 ENCOUNTER — HOSPITAL ENCOUNTER (OUTPATIENT)
Dept: LAB | Facility: MEDICAL CENTER | Age: 34
End: 2019-07-25
Attending: NURSE PRACTITIONER
Payer: COMMERCIAL

## 2019-07-25 DIAGNOSIS — R79.89 ELEVATED LIVER FUNCTION TESTS: ICD-10-CM

## 2019-07-25 LAB
ALBUMIN SERPL BCP-MCNC: 4.7 G/DL (ref 3.2–4.9)
ALP SERPL-CCNC: 61 U/L (ref 30–99)
ALT SERPL-CCNC: 19 U/L (ref 2–50)
AST SERPL-CCNC: 23 U/L (ref 12–45)
BILIRUB CONJ SERPL-MCNC: 0.2 MG/DL (ref 0.1–0.5)
BILIRUB INDIRECT SERPL-MCNC: 0.9 MG/DL (ref 0–1)
BILIRUB SERPL-MCNC: 1.1 MG/DL (ref 0.1–1.5)
PROT SERPL-MCNC: 7.8 G/DL (ref 6–8.2)

## 2019-07-25 PROCEDURE — 36415 COLL VENOUS BLD VENIPUNCTURE: CPT

## 2019-07-25 PROCEDURE — 80076 HEPATIC FUNCTION PANEL: CPT

## 2020-01-14 ENCOUNTER — OFFICE VISIT (OUTPATIENT)
Dept: URGENT CARE | Facility: PHYSICIAN GROUP | Age: 35
End: 2020-01-14
Payer: COMMERCIAL

## 2020-01-14 VITALS
WEIGHT: 225 LBS | DIASTOLIC BLOOD PRESSURE: 84 MMHG | OXYGEN SATURATION: 98 % | HEART RATE: 84 BPM | SYSTOLIC BLOOD PRESSURE: 138 MMHG | RESPIRATION RATE: 16 BRPM | BODY MASS INDEX: 27.98 KG/M2 | HEIGHT: 75 IN | TEMPERATURE: 97.9 F

## 2020-01-14 DIAGNOSIS — Z87.09 HISTORY OF ASTHMA: ICD-10-CM

## 2020-01-14 DIAGNOSIS — R06.2 WHEEZING: ICD-10-CM

## 2020-01-14 DIAGNOSIS — J22 LOWER RESP. TRACT INFECTION: ICD-10-CM

## 2020-01-14 PROCEDURE — 99214 OFFICE O/P EST MOD 30 MIN: CPT | Performed by: NURSE PRACTITIONER

## 2020-01-14 RX ORDER — DOXYCYCLINE HYCLATE 100 MG
100 TABLET ORAL 2 TIMES DAILY
Qty: 10 TAB | Refills: 0 | Status: SHIPPED | OUTPATIENT
Start: 2020-01-14 | End: 2020-01-19

## 2020-01-14 RX ORDER — METHYLPREDNISOLONE 4 MG/1
TABLET ORAL
Qty: 21 TAB | Refills: 0 | Status: SHIPPED
Start: 2020-01-14 | End: 2020-05-13

## 2020-01-14 ASSESSMENT — ENCOUNTER SYMPTOMS
EYE DISCHARGE: 0
SHORTNESS OF BREATH: 1
CHILLS: 1
VOMITING: 0
ABDOMINAL PAIN: 0
FEVER: 1
EYE REDNESS: 0
NAUSEA: 0
SORE THROAT: 0
STRIDOR: 0
HEADACHES: 0
SPUTUM PRODUCTION: 1
COUGH: 1
PALPITATIONS: 0
WHEEZING: 1

## 2020-01-14 NOTE — PROGRESS NOTES
"Subjective:   Roge Meade is a 34 y.o. male who presents for Cough (congestion, dark mucus, fatigue, voice loss,  X 3 week )        Cough   This is a new problem. The current episode started 1 to 4 weeks ago (Started 3 weeks ago). The problem has been gradually worsening. The problem occurs every few minutes. The cough is productive of sputum and productive of brown sputum. Associated symptoms include chills, a fever, nasal congestion, postnasal drip, shortness of breath and wheezing. Pertinent negatives include no chest pain, ear pain, eye redness, headaches, rash or sore throat. The symptoms are aggravated by lying down. He has tried OTC cough suppressant and a beta-agonist inhaler for the symptoms. The treatment provided mild relief. His past medical history is significant for asthma.        Review of Systems   Constitutional: Positive for chills and fever.   HENT: Positive for congestion and postnasal drip. Negative for ear discharge, ear pain and sore throat.    Eyes: Negative for discharge and redness.   Respiratory: Positive for cough, sputum production, shortness of breath and wheezing. Negative for stridor.    Cardiovascular: Negative for chest pain and palpitations.   Gastrointestinal: Negative for abdominal pain, nausea and vomiting.   Skin: Negative for itching and rash.   Neurological: Negative for headaches.   All other systems reviewed and are negative.    Patient's PMH, SocHx, SurgHx, FamHx, Drug allergies and medications reviewed.     Objective:   /84   Pulse 84   Temp 36.6 °C (97.9 °F)   Resp 16   Ht 1.905 m (6' 3\")   Wt 102.1 kg (225 lb)   SpO2 98%   BMI 28.12 kg/m²   Physical Exam  Vitals signs reviewed.   Constitutional:       Appearance: He is well-developed.   HENT:      Head: Normocephalic.      Right Ear: Tympanic membrane and ear canal normal. No middle ear effusion. Tympanic membrane is not perforated or erythematous.      Left Ear: Tympanic membrane and ear canal " normal.  No middle ear effusion. Tympanic membrane is not perforated or erythematous.      Nose: Nose normal. No rhinorrhea.      Right Sinus: No maxillary sinus tenderness or frontal sinus tenderness.      Left Sinus: No maxillary sinus tenderness or frontal sinus tenderness.      Mouth/Throat:      Lips: Pink.      Mouth: Mucous membranes are moist.      Pharynx: Oropharynx is clear. Uvula midline. Posterior oropharyngeal erythema present. No oropharyngeal exudate or uvula swelling.      Tonsils: No tonsillar exudate.   Eyes:      General: Lids are normal.      Extraocular Movements: Extraocular movements intact.      Conjunctiva/sclera: Conjunctivae normal.      Pupils: Pupils are equal, round, and reactive to light.   Neck:      Musculoskeletal: Normal range of motion.      Thyroid: No thyromegaly.   Cardiovascular:      Rate and Rhythm: Normal rate and regular rhythm.      Heart sounds: Normal heart sounds.   Pulmonary:      Effort: Pulmonary effort is normal. No tachypnea, bradypnea, accessory muscle usage, prolonged expiration or respiratory distress.      Breath sounds: Examination of the right-upper field reveals wheezing. Examination of the left-upper field reveals wheezing. Examination of the left-lower field reveals decreased breath sounds. Decreased breath sounds and wheezing present.   Lymphadenopathy:      Head:      Right side of head: No submandibular or tonsillar adenopathy.      Left side of head: No submandibular or tonsillar adenopathy.   Skin:     General: Skin is warm and dry.      Findings: No rash.   Neurological:      Mental Status: He is alert and oriented to person, place, and time.   Psychiatric:         Mood and Affect: Mood normal.         Speech: Speech normal.         Behavior: Behavior normal. Behavior is cooperative.         Thought Content: Thought content normal.         Judgment: Judgment normal.           Assessment/Plan:   Assessment    1. Wheezing  - methylPREDNISolone  (MEDROL DOSEPAK) 4 MG Tablet Therapy Pack; Follow schedule on package instructions.  Dispense: 21 Tab; Refill: 0  - doxycycline (VIBRAMYCIN) 100 MG Tab; Take 1 Tab by mouth 2 times a day for 5 days.  Dispense: 10 Tab; Refill: 0    2. Lower resp. tract infection  - doxycycline (VIBRAMYCIN) 100 MG Tab; Take 1 Tab by mouth 2 times a day for 5 days.  Dispense: 10 Tab; Refill: 0    3. History of asthma  - methylPREDNISolone (MEDROL DOSEPAK) 4 MG Tablet Therapy Pack; Follow schedule on package instructions.  Dispense: 21 Tab; Refill: 0    Patient declines breathing treatment and declines xray during this visit. Will covered for bacterial LRTI. We discussed if symptoms worsen, he is to return to office and consider chest xray.    Differential diagnosis, natural history, supportive care, and indications for immediate follow-up discussed.     **Please note that all invasive procedures during this visit were performed by myself and/or the Medical Assistant under the supervision of the PA or MD in office**

## 2020-05-13 ENCOUNTER — TELEMEDICINE (OUTPATIENT)
Dept: MEDICAL GROUP | Facility: MEDICAL CENTER | Age: 35
End: 2020-05-13
Payer: COMMERCIAL

## 2020-05-13 ENCOUNTER — HOSPITAL ENCOUNTER (OUTPATIENT)
Dept: LAB | Facility: MEDICAL CENTER | Age: 35
End: 2020-05-13
Attending: NURSE PRACTITIONER
Payer: COMMERCIAL

## 2020-05-13 VITALS
WEIGHT: 235 LBS | BODY MASS INDEX: 29.22 KG/M2 | DIASTOLIC BLOOD PRESSURE: 112 MMHG | RESPIRATION RATE: 16 BRPM | HEART RATE: 77 BPM | SYSTOLIC BLOOD PRESSURE: 155 MMHG | HEIGHT: 75 IN

## 2020-05-13 DIAGNOSIS — T78.40XS ALLERGIC STATE, SEQUELA: ICD-10-CM

## 2020-05-13 DIAGNOSIS — Z00.00 ROUTINE GENERAL MEDICAL EXAMINATION AT A HEALTH CARE FACILITY: ICD-10-CM

## 2020-05-13 DIAGNOSIS — J45.990 EXERCISE-INDUCED ASTHMA: ICD-10-CM

## 2020-05-13 DIAGNOSIS — I10 ESSENTIAL HYPERTENSION: ICD-10-CM

## 2020-05-13 PROBLEM — T78.40XA ALLERGIES: Status: ACTIVE | Noted: 2020-05-13

## 2020-05-13 PROBLEM — M54.50 BILATERAL LOW BACK PAIN WITHOUT SCIATICA: Status: RESOLVED | Noted: 2019-03-15 | Resolved: 2020-05-13

## 2020-05-13 LAB
ALBUMIN SERPL BCP-MCNC: 4.6 G/DL (ref 3.2–4.9)
ALBUMIN/GLOB SERPL: 1.7 G/DL
ALP SERPL-CCNC: 62 U/L (ref 30–99)
ALT SERPL-CCNC: 31 U/L (ref 2–50)
ANION GAP SERPL CALC-SCNC: 13 MMOL/L (ref 7–16)
AST SERPL-CCNC: 27 U/L (ref 12–45)
BASOPHILS # BLD AUTO: 0.5 % (ref 0–1.8)
BASOPHILS # BLD: 0.03 K/UL (ref 0–0.12)
BILIRUB SERPL-MCNC: 0.8 MG/DL (ref 0.1–1.5)
BUN SERPL-MCNC: 17 MG/DL (ref 8–22)
CALCIUM SERPL-MCNC: 9.1 MG/DL (ref 8.5–10.5)
CHLORIDE SERPL-SCNC: 105 MMOL/L (ref 96–112)
CHOLEST SERPL-MCNC: 197 MG/DL (ref 100–199)
CO2 SERPL-SCNC: 24 MMOL/L (ref 20–33)
CREAT SERPL-MCNC: 0.91 MG/DL (ref 0.5–1.4)
EOSINOPHIL # BLD AUTO: 0.07 K/UL (ref 0–0.51)
EOSINOPHIL NFR BLD: 1.2 % (ref 0–6.9)
ERYTHROCYTE [DISTWIDTH] IN BLOOD BY AUTOMATED COUNT: 42.2 FL (ref 35.9–50)
FASTING STATUS PATIENT QL REPORTED: NORMAL
GLOBULIN SER CALC-MCNC: 2.7 G/DL (ref 1.9–3.5)
GLUCOSE SERPL-MCNC: 87 MG/DL (ref 65–99)
HCT VFR BLD AUTO: 45.9 % (ref 42–52)
HDLC SERPL-MCNC: 42 MG/DL
HGB BLD-MCNC: 15.5 G/DL (ref 14–18)
IMM GRANULOCYTES # BLD AUTO: 0.01 K/UL (ref 0–0.11)
IMM GRANULOCYTES NFR BLD AUTO: 0.2 % (ref 0–0.9)
LDLC SERPL CALC-MCNC: 132 MG/DL
LYMPHOCYTES # BLD AUTO: 2.9 K/UL (ref 1–4.8)
LYMPHOCYTES NFR BLD: 50.5 % (ref 22–41)
MCH RBC QN AUTO: 32.1 PG (ref 27–33)
MCHC RBC AUTO-ENTMCNC: 33.8 G/DL (ref 33.7–35.3)
MCV RBC AUTO: 95 FL (ref 81.4–97.8)
MONOCYTES # BLD AUTO: 0.44 K/UL (ref 0–0.85)
MONOCYTES NFR BLD AUTO: 7.7 % (ref 0–13.4)
NEUTROPHILS # BLD AUTO: 2.29 K/UL (ref 1.82–7.42)
NEUTROPHILS NFR BLD: 39.9 % (ref 44–72)
NRBC # BLD AUTO: 0 K/UL
NRBC BLD-RTO: 0 /100 WBC
PLATELET # BLD AUTO: 195 K/UL (ref 164–446)
PMV BLD AUTO: 11.3 FL (ref 9–12.9)
POTASSIUM SERPL-SCNC: 3.9 MMOL/L (ref 3.6–5.5)
PROT SERPL-MCNC: 7.3 G/DL (ref 6–8.2)
RBC # BLD AUTO: 4.83 M/UL (ref 4.7–6.1)
SODIUM SERPL-SCNC: 142 MMOL/L (ref 135–145)
TRIGL SERPL-MCNC: 117 MG/DL (ref 0–149)
WBC # BLD AUTO: 5.7 K/UL (ref 4.8–10.8)

## 2020-05-13 PROCEDURE — 80053 COMPREHEN METABOLIC PANEL: CPT

## 2020-05-13 PROCEDURE — 36415 COLL VENOUS BLD VENIPUNCTURE: CPT

## 2020-05-13 PROCEDURE — 85025 COMPLETE CBC W/AUTO DIFF WBC: CPT

## 2020-05-13 PROCEDURE — 80061 LIPID PANEL: CPT

## 2020-05-13 PROCEDURE — 99214 OFFICE O/P EST MOD 30 MIN: CPT | Mod: 95,CR | Performed by: NURSE PRACTITIONER

## 2020-05-13 RX ORDER — AMLODIPINE BESYLATE 5 MG/1
5 TABLET ORAL DAILY
Qty: 90 TAB | Refills: 2 | Status: SHIPPED
Start: 2020-05-13 | End: 2020-12-16

## 2020-05-13 RX ORDER — LOSARTAN POTASSIUM 100 MG/1
100 TABLET ORAL DAILY
Qty: 90 TAB | Refills: 3 | Status: SHIPPED | OUTPATIENT
Start: 2020-05-13 | End: 2020-12-16 | Stop reason: SDUPTHER

## 2020-05-13 NOTE — PROGRESS NOTES
"This encounter was conducted via Zoom meeting  Verbal consent was obtained. Patient's identity was verified. Not seen since 3/2019.      CC:Needed follow up on hypertension and med refills.                                                                                                                                     HPI:   Roge presents today with the following.    1. Essential hypertension  Patient on 100 mg of Losarten but states home readings have been high. Mom recently had CVA with HTN, smoking and TREV. Patient wants additional meds. Running 4 miles per day without problem,    2. Exercise-induced asthma  Reports no problems even with allergy season. Uses albuterol infrequently.    3. Allergic state, sequela  Admits allergies but no problems with OTC antihistamine daily.    4. Routine general medical examination at a health care facility  Due for lab testing.      Patient Active Problem List    Diagnosis Date Noted   • Allergies 05/13/2020   • Exercise-induced asthma 03/15/2019   • Obesity (BMI 30-39.9) 02/26/2018   • Essential hypertension 07/21/2017       Current Outpatient Medications   Medication Sig Dispense Refill   • amLODIPine (NORVASC) 5 MG Tab Take 1 Tab by mouth every day. 90 Tab 2   • losartan (COZAAR) 100 MG Tab Take 1 Tab by mouth every day. 90 Tab 3   • albuterol 108 (90 Base) MCG/ACT Aero Soln inhalation aerosol Inhale 2 Puffs by mouth every 6 hours as needed for Shortness of Breath. 8.5 g 0     No current facility-administered medications for this visit.          Allergies as of 05/13/2020 - Reviewed 05/13/2020   Allergen Reaction Noted   • Lisinopril Cough 03/15/2019   • Sulfa drugs Anaphylaxis 10/20/2016        ROS:  Denies, chest pain, Shortness of breath, Edema,wheezing or cough. positive for allergy symptoms.    /112 (Patient Position: Sitting, BP Cuff Size: Adult) Comment: PT REPORTED  Pulse 77   Resp 16   Ht 1.905 m (6' 3\")   Wt 106.6 kg (235 lb)   BMI 29.37 kg/m² "       Physical Exam:  Constitutional: Alert, no distress, well-groomed.  Skin: No rashes in visible areas.  Eye: Round. Conjunctiva clear, lNo icterus.   ENMT: Lips pink without lesions, good dentition, moist mucous membranes. Phonation normal.  Neck: No masses, no thyromegaly. Moves freely without pain.  CV: Pulse as reported by patient  Respiratory: Unlabored respiratory effort, no cough or audible wheeze  Psych: Alert and oriented x3, normal affect and mood.          Assessment and Plan.   34 y.o. male with the following issues.    1. Essential hypertension  Reviewed blood pressure goal is 130/80 or less which patient states currently not at goal. I will add amlodipine with side effects discussed. He is to try different cuffs and then come to office to check readings. Discussed risk for long term uncontrolled B/P.  - amLODIPine (NORVASC) 5 MG Tab; Take 1 Tab by mouth every day.  Dispense: 90 Tab; Refill: 2  - losartan (COZAAR) 100 MG Tab; Take 1 Tab by mouth every day.  Dispense: 90 Tab; Refill: 3    2. Exercise-induced asthma  Appears well controlled and he will continue with his antihistamine.    3. Allergic state, sequela  Oral antihistamine all currently needed.    4. Routine general medical examination at a health care facility  Labs to be done and patient to RTO soon.  - Comp Metabolic Panel; Future  - Lipid Profile; Future  - CBC WITH DIFFERENTIAL; Future

## 2020-09-08 ENCOUNTER — OFFICE VISIT (OUTPATIENT)
Dept: URGENT CARE | Facility: PHYSICIAN GROUP | Age: 35
End: 2020-09-08
Payer: COMMERCIAL

## 2020-09-08 VITALS
RESPIRATION RATE: 16 BRPM | OXYGEN SATURATION: 99 % | HEIGHT: 75 IN | TEMPERATURE: 97.9 F | SYSTOLIC BLOOD PRESSURE: 166 MMHG | WEIGHT: 236 LBS | HEART RATE: 79 BPM | BODY MASS INDEX: 29.34 KG/M2 | DIASTOLIC BLOOD PRESSURE: 100 MMHG

## 2020-09-08 DIAGNOSIS — R03.0 ELEVATED BLOOD PRESSURE READING: ICD-10-CM

## 2020-09-08 DIAGNOSIS — M54.2 CERVICAL PAIN (NECK): ICD-10-CM

## 2020-09-08 PROCEDURE — 99213 OFFICE O/P EST LOW 20 MIN: CPT | Performed by: PHYSICIAN ASSISTANT

## 2020-09-08 ASSESSMENT — ENCOUNTER SYMPTOMS
SENSORY CHANGE: 0
CONSTITUTIONAL NEGATIVE: 1
TROUBLE SWALLOWING: 0
TINGLING: 0
HEADACHES: 0
SPEECH CHANGE: 0
VISUAL CHANGE: 0
NUMBNESS: 0
EYES NEGATIVE: 1
PARESIS: 0
SINUS PAIN: 0
NEUROLOGICAL NEGATIVE: 1
PALPITATIONS: 0
FEVER: 0
SORE THROAT: 0
DIZZINESS: 0
SYNCOPE: 0
RESPIRATORY NEGATIVE: 1
NECK PAIN: 1
CARDIOVASCULAR NEGATIVE: 1
LEG PAIN: 0
FOCAL WEAKNESS: 0
STRIDOR: 0
GASTROINTESTINAL NEGATIVE: 1
PHOTOPHOBIA: 0
BLURRED VISION: 0
BACK PAIN: 0

## 2020-09-08 ASSESSMENT — FIBROSIS 4 INDEX: FIB4 SCORE: 0.87

## 2020-09-08 NOTE — PROGRESS NOTES
Subjective:      Roge Meade is a 35 y.o. male who presents with Neck Pain (left side neck pain, possibly twisted neck, now having left ear pain, jaw pain, or possibly ear infection, x2 days )            Anterior left-sided neck pain.  No traumatic event however he was on a treadmill when it came to an abrupt stop and it jolted his neck.  He is unsure if this is the cause.  He states the pain radiates into his ear.  Denies fever, chills, headache, dizziness, weakness, chest pain, palpitations, lower leg swelling, shortness of breath, cough.      Neck Pain   This is a new problem. The current episode started in the past 7 days (2 days). The problem occurs constantly. The problem has been unchanged. The pain is associated with nothing. The pain is present in the anterior neck and left side. Exacerbated by: Extension. Pertinent negatives include no chest pain, fever, headaches, leg pain, numbness, pain with swallowing, paresis, photophobia, syncope, tingling, trouble swallowing or visual change. He has tried nothing for the symptoms. The treatment provided no relief.       PMH:  has a past medical history of Knee joint cyst (2002).  MEDS:   Current Outpatient Medications:   •  amLODIPine (NORVASC) 5 MG Tab, Take 1 Tab by mouth every day., Disp: 90 Tab, Rfl: 2  •  losartan (COZAAR) 100 MG Tab, Take 1 Tab by mouth every day., Disp: 90 Tab, Rfl: 3  •  albuterol 108 (90 Base) MCG/ACT Aero Soln inhalation aerosol, Inhale 2 Puffs by mouth every 6 hours as needed for Shortness of Breath., Disp: 8.5 g, Rfl: 0  ALLERGIES:   Allergies   Allergen Reactions   • Lisinopril Cough   • Sulfa Drugs Anaphylaxis     SURGHX:   Past Surgical History:   Procedure Laterality Date   • CYST EXCISION Right 2002    knee   • FASCIOTOMY Bilateral 2001    secondary to calf hypertrophy     SOCHX:  reports that he has never smoked. He has never used smokeless tobacco. He reports current alcohol use of about 5.4 oz of alcohol per week. He  "reports that he does not use drugs.  FH: family history includes Alcohol/Drug in his father; Asthma in his son; Cancer in his father; Diabetes in his mother; Hypertension in his father; No Known Problems in his daughter; Psychiatric Illness in his sister; Sleep Apnea in his mother; Stroke in his mother.      Review of Systems   Constitutional: Negative.  Negative for fever.   HENT: Negative for congestion, sinus pain, sore throat and trouble swallowing.    Eyes: Negative.  Negative for blurred vision and photophobia.   Respiratory: Negative.  Negative for stridor.    Cardiovascular: Negative.  Negative for chest pain, palpitations, leg swelling and syncope.   Gastrointestinal: Negative.    Genitourinary: Negative.    Musculoskeletal: Positive for neck pain. Negative for back pain.   Neurological: Negative.  Negative for dizziness, tingling, sensory change, speech change, focal weakness, numbness and headaches.       Medications, Allergies, and current problem list reviewed today in Epic     Objective:     BP (!) 166/100 (BP Location: Right arm, Patient Position: Sitting, BP Cuff Size: Adult)   Pulse 79   Temp 36.6 °C (97.9 °F) (Temporal)   Resp 16   Ht 1.905 m (6' 3\")   Wt 107 kg (236 lb)   SpO2 99%   BMI 29.50 kg/m²      Physical Exam  Vitals signs and nursing note reviewed.   Constitutional:       General: He is not in acute distress.     Appearance: He is well-developed. He is not diaphoretic.   HENT:      Head: Normocephalic and atraumatic.      Right Ear: Hearing, tympanic membrane, ear canal and external ear normal.      Left Ear: Hearing, tympanic membrane, ear canal and external ear normal.      Nose: Nose normal. No congestion or rhinorrhea.      Mouth/Throat:      Mouth: Mucous membranes are moist.      Dentition: Normal dentition.      Pharynx: Oropharynx is clear. Uvula midline. No pharyngeal swelling, oropharyngeal exudate, posterior oropharyngeal erythema or uvula swelling.      Tonsils: No " tonsillar exudate or tonsillar abscesses.   Eyes:      General:         Right eye: No discharge.         Left eye: No discharge.      Extraocular Movements: Extraocular movements intact.      Conjunctiva/sclera: Conjunctivae normal.      Pupils: Pupils are equal, round, and reactive to light.   Neck:      Musculoskeletal: Normal range of motion and neck supple. Normal range of motion. Pain with movement (With extreme extension) and muscular tenderness present. No edema, erythema, neck rigidity, crepitus, injury or spinous process tenderness.      Thyroid: No thyromegaly or thyroid tenderness.      Vascular: Normal carotid pulses. No carotid bruit or JVD.      Trachea: Trachea normal.      Meningeal: Brudzinski's sign and Kernig's sign absent.     Cardiovascular:      Rate and Rhythm: Normal rate and regular rhythm.      Pulses: Normal pulses.      Heart sounds: Normal heart sounds. No murmur.   Pulmonary:      Effort: Pulmonary effort is normal. No respiratory distress.      Breath sounds: Normal breath sounds. No wheezing.   Chest:      Chest wall: No tenderness.   Musculoskeletal:      Right lower leg: No edema.      Left lower leg: No edema.   Lymphadenopathy:      Cervical: No cervical adenopathy.      Right cervical: No superficial cervical adenopathy.     Left cervical: No superficial cervical adenopathy.   Skin:     General: Skin is warm and dry.   Neurological:      Mental Status: He is alert and oriented to person, place, and time.   Psychiatric:         Behavior: Behavior normal.         Thought Content: Thought content normal.         Judgment: Judgment normal.                 Assessment/Plan:         1. Cervical pain (neck)     2. Elevated blood pressure reading       Left-sided anterior cervical pain.  Pain does radiate into his left ear.  ENT exam normal with no signs of otitis media, sinusitis, tonsillitis, parotitis, lymphadenitis.  No lymph node swelling noted.  No erythema or skin changes.  No  thyroid enlargement or tenderness.  Vascular exam unremarkable with no signs of JVD or carotid bruits.  Carotid pulses equal bilaterally.  Pain does appear anterior to the left carotid.  Vital signs do show elevated blood pressure but he has a long well-documented history of elevated readings in clinic.  Inclined to treat as a musculoskeletal etiology with strict ER precautions.  Any new, changing, worsening symptoms return to clinic for further work-up and management.  OTC meds and conservative measures as discussed    Return to clinic or go to ED if symptoms worsen or persist. Indications for ED discussed at length. Patient voices understanding. Follow-up with your primary care provider in 3-5 days. Red flags discussed. All side effects of medication discussed including allergic response, GI upset, tendon injury, etc.    Please note that this dictation was created using voice recognition software. I have made every reasonable attempt to correct obvious errors, but I expect that there are errors of grammar and possibly content that I did not discover before finalizing the note.

## 2020-12-16 ENCOUNTER — OFFICE VISIT (OUTPATIENT)
Dept: MEDICAL GROUP | Facility: MEDICAL CENTER | Age: 35
End: 2020-12-16
Payer: COMMERCIAL

## 2020-12-16 VITALS
HEART RATE: 84 BPM | TEMPERATURE: 97.1 F | SYSTOLIC BLOOD PRESSURE: 150 MMHG | OXYGEN SATURATION: 98 % | HEIGHT: 75 IN | DIASTOLIC BLOOD PRESSURE: 80 MMHG | WEIGHT: 233.69 LBS | BODY MASS INDEX: 29.06 KG/M2

## 2020-12-16 DIAGNOSIS — Z00.00 ROUTINE GENERAL MEDICAL EXAMINATION AT A HEALTH CARE FACILITY: ICD-10-CM

## 2020-12-16 DIAGNOSIS — J45.990 EXERCISE-INDUCED ASTHMA: ICD-10-CM

## 2020-12-16 DIAGNOSIS — L85.3 DRY SKIN DERMATITIS: ICD-10-CM

## 2020-12-16 DIAGNOSIS — Z23 NEED FOR VACCINATION: ICD-10-CM

## 2020-12-16 DIAGNOSIS — I10 ESSENTIAL HYPERTENSION: ICD-10-CM

## 2020-12-16 PROBLEM — E66.9 OBESITY (BMI 30-39.9): Status: RESOLVED | Noted: 2018-02-26 | Resolved: 2020-12-16

## 2020-12-16 PROCEDURE — 99214 OFFICE O/P EST MOD 30 MIN: CPT | Mod: 25 | Performed by: NURSE PRACTITIONER

## 2020-12-16 PROCEDURE — 90471 IMMUNIZATION ADMIN: CPT | Performed by: NURSE PRACTITIONER

## 2020-12-16 PROCEDURE — 90686 IIV4 VACC NO PRSV 0.5 ML IM: CPT | Performed by: NURSE PRACTITIONER

## 2020-12-16 RX ORDER — LOSARTAN POTASSIUM 100 MG/1
100 TABLET ORAL DAILY
Qty: 90 TAB | Refills: 3 | Status: SHIPPED
Start: 2020-12-16 | End: 2021-11-10

## 2020-12-16 RX ORDER — AMLODIPINE BESYLATE 5 MG/1
5 TABLET ORAL DAILY
Qty: 90 TAB | Refills: 3 | Status: CANCELLED | OUTPATIENT
Start: 2020-12-16

## 2020-12-16 RX ORDER — AMLODIPINE BESYLATE 10 MG/1
10 TABLET ORAL DAILY
Qty: 90 TAB | Refills: 3 | Status: SHIPPED | OUTPATIENT
Start: 2020-12-16 | End: 2021-11-10 | Stop reason: SDUPTHER

## 2020-12-16 RX ORDER — LOSARTAN POTASSIUM 100 MG/1
100 TABLET ORAL DAILY
Qty: 90 TAB | Refills: 3 | Status: CANCELLED | OUTPATIENT
Start: 2020-12-16

## 2020-12-16 ASSESSMENT — PATIENT HEALTH QUESTIONNAIRE - PHQ9: CLINICAL INTERPRETATION OF PHQ2 SCORE: 0

## 2020-12-16 ASSESSMENT — FIBROSIS 4 INDEX: FIB4 SCORE: 0.87

## 2020-12-16 NOTE — PROGRESS NOTES
Subjective:      Roge Meade is a 35 y.o. male who presents with Hypertension (Med Mgnt)        CC: Patient is here today for 7-month follow-up on hypertension and asthma as well as issues with dry skin and needing lab work and immunizations.    HPI         1. Essential hypertension  Patient states he has been continuing to be active and runs regularly without problems and has lost weight over the last year through dieting but despite this his blood pressure has been elevated.  When I had a telemedicine visit with him in May, his blood pressure had been running high so amlodipine 5 mg was added to his losartan 100 mg.  It was improving but he states lately his systolic blood pressure has been running in the 140s.  He states he has cut back on stimulants such as coffee and does not smoke and rarely drinks alcohol.  He is under some stress as a manager.    2. Exercise-induced asthma  Patient states his asthma has been doing very well and he has not needed to use his albuterol in a while    3. Dry skin dermatitis  Patient states with the winter weather he sometimes gets dry skin of his hands bilaterally, he finds that using moisturizer helps this.    4. Need for vaccination  Patient needs flu vaccine today but also has questions regarding a trip to the [facility] planned next year for which he might need multiple vaccines.    5. Routine general medical examination at a health care facility  Patient had lab work done in May of last year which showed a normal chemistry panel, LDL slightly elevated at 132, and normal CBC.  Past Medical History:   Diagnosis Date   • Knee joint cyst 2002     Social History     Socioeconomic History   • Marital status:      Spouse name: Not on file   • Number of children: Not on file   • Years of education: Not on file   • Highest education level: Not on file   Occupational History   • Not on file   Social Needs   • Financial resource strain: Not on file   • Food insecurity      Worry: Not on file     Inability: Not on file   • Transportation needs     Medical: Not on file     Non-medical: Not on file   Tobacco Use   • Smoking status: Never Smoker   • Smokeless tobacco: Never Used   • Tobacco comment: continue to avoid   Substance and Sexual Activity   • Alcohol use: Yes     Alcohol/week: 5.4 oz     Types: 9 Shots of liquor per week   • Drug use: No   • Sexual activity: Yes     Partners: Female     Birth control/protection: Surgical     Comment:    Lifestyle   • Physical activity     Days per week: Not on file     Minutes per session: Not on file   • Stress: Not on file   Relationships   • Social connections     Talks on phone: Not on file     Gets together: Not on file     Attends [employer] service: Not on file     Active member of club or organization: Not on file     Attends meetings of clubs or organizations: Not on file     Relationship status: Not on file   • Intimate partner violence     Fear of current or ex partner: Not on file     Emotionally abused: Not on file     Physically abused: Not on file     Forced sexual activity: Not on file   Other Topics Concern   • Not on file   Social History Narrative   • Not on file     Current Outpatient Medications   Medication Sig Dispense Refill   • amLODIPine (NORVASC) 10 MG Tab Take 1 Tab by mouth every day. 90 Tab 3   • losartan (COZAAR) 100 MG Tab Take 1 Tab by mouth every day. 90 Tab 3   • albuterol 108 (90 Base) MCG/ACT Aero Soln inhalation aerosol Inhale 2 Puffs by mouth every 6 hours as needed for Shortness of Breath. 8.5 g 0     No current facility-administered medications for this visit.      Family History   Problem Relation Age of Onset   • Stroke Mother    • Diabetes Mother    • Sleep Apnea Mother    • Hypertension Father    • Cancer Father         bladder   • Alcohol/Drug Father    • Psychiatric Illness Sister         eating disorder   • Asthma Son    • No Known Problems Daughter          Review of Systems   Skin: Positive  "for itching and rash.   All other systems reviewed and are negative.         Objective:     /80 (BP Location: Right arm, Patient Position: Sitting, BP Cuff Size: Adult)   Pulse 84   Temp 36.2 °C (97.1 °F) (Temporal)   Ht 1.905 m (6' 3\")   Wt 106 kg (233 lb 11 oz)   SpO2 98%   BMI 29.21 kg/m²      Physical Exam  Vitals signs and nursing note reviewed.   Constitutional:       General: He is not in acute distress.     Appearance: He is well-developed. He is not diaphoretic.   HENT:      Head: Normocephalic and atraumatic.      Right Ear: External ear normal.      Left Ear: External ear normal.      Nose: Nose normal.   Eyes:      General:         Right eye: No discharge.         Left eye: No discharge.      Conjunctiva/sclera: Conjunctivae normal.   Neck:      Musculoskeletal: Normal range of motion and neck supple.      Thyroid: No thyromegaly.      Trachea: No tracheal deviation.   Cardiovascular:      Rate and Rhythm: Normal rate and regular rhythm.      Heart sounds: Normal heart sounds. No murmur.   Pulmonary:      Effort: Pulmonary effort is normal. No respiratory distress.      Breath sounds: Normal breath sounds. No wheezing or rales.   Lymphadenopathy:      Cervical: No cervical adenopathy.   Skin:     General: Skin is warm and dry.      Findings: Rash present. No erythema.      Comments: Mild erythema of the posterior hands bilaterally.   Neurological:      Mental Status: He is alert and oriented to person, place, and time.      Coordination: Coordination normal.   Psychiatric:         Behavior: Behavior normal.         Thought Content: Thought content normal.         Judgment: Judgment normal.                 Assessment/Plan:        1. Essential hypertension  Patient is aware that his goal is a blood pressure of 130/80 or less and despite exercising, losing weight and avoiding stimulants and high sodium diet, he states his blood pressure has been running higher systolically.  Therefore, I will " increase his amlodipine to 10 mg and continue him on losartan 100 mg.  He is to report any lower extremity swelling.  - amLODIPine (NORVASC) 10 MG Tab; Take 1 Tab by mouth every day.  Dispense: 90 Tab; Refill: 3  - losartan (COZAAR) 100 MG Tab; Take 1 Tab by mouth every day.  Dispense: 90 Tab; Refill: 3    2. Exercise-induced asthma  Patient reports he is doing very well and rarely needs to use albuterol    3. Dry skin dermatitis  Very mild dermatitis possibly secondary to winter weather and dry hair so I advised moisturization of the skin and to report any worsening symptoms in which case I would order steroid creams.    4. Need for vaccination  I have placed the below orders and discussed them with an approved delegating provider. The MA is performing the below orders under the direction of Dr. Shu MD.    - Influenza Vaccine Quad Injection (PF)    5. Routine general medical examination at a health care facility  Patient will do routine blood work in May  - TSH; Future  - Comp Metabolic Panel; Future  - Lipid Profile; Future  - VITAMIN D,25 HYDROXY; Future    I advised patient to come back for a TD AP and pneumococcal 23 vaccine in the near future.  I did explain that we do not carry many of the vaccines needed for travel that it sounds like he may need and to try to find a local travel clinic for these.

## 2021-02-24 ENCOUNTER — OFFICE VISIT (OUTPATIENT)
Dept: MEDICAL GROUP | Facility: MEDICAL CENTER | Age: 36
End: 2021-02-24
Payer: COMMERCIAL

## 2021-02-24 VITALS
WEIGHT: 233 LBS | HEIGHT: 75 IN | HEART RATE: 66 BPM | DIASTOLIC BLOOD PRESSURE: 74 MMHG | RESPIRATION RATE: 16 BRPM | SYSTOLIC BLOOD PRESSURE: 144 MMHG | OXYGEN SATURATION: 100 % | BODY MASS INDEX: 28.97 KG/M2

## 2021-02-24 DIAGNOSIS — Z23 NEED FOR TDAP VACCINATION: ICD-10-CM

## 2021-02-24 DIAGNOSIS — Z29.89 NEED FOR MALARIA PROPHYLAXIS: ICD-10-CM

## 2021-02-24 DIAGNOSIS — Z00.00 ROUTINE GENERAL MEDICAL EXAMINATION AT A HEALTH CARE FACILITY: ICD-10-CM

## 2021-02-24 DIAGNOSIS — Z23 NEED FOR HEPATITIS A VACCINATION: ICD-10-CM

## 2021-02-24 DIAGNOSIS — I10 ESSENTIAL HYPERTENSION: ICD-10-CM

## 2021-02-24 PROCEDURE — 90715 TDAP VACCINE 7 YRS/> IM: CPT | Performed by: NURSE PRACTITIONER

## 2021-02-24 PROCEDURE — 99395 PREV VISIT EST AGE 18-39: CPT | Mod: 25 | Performed by: NURSE PRACTITIONER

## 2021-02-24 PROCEDURE — 90472 IMMUNIZATION ADMIN EACH ADD: CPT | Performed by: NURSE PRACTITIONER

## 2021-02-24 PROCEDURE — 90471 IMMUNIZATION ADMIN: CPT | Performed by: NURSE PRACTITIONER

## 2021-02-24 PROCEDURE — 90632 HEPA VACCINE ADULT IM: CPT | Performed by: NURSE PRACTITIONER

## 2021-02-24 RX ORDER — ATOVAQUONE AND PROGUANIL HYDROCHLORIDE 250; 100 MG/1; MG/1
1 TABLET, FILM COATED ORAL DAILY
Qty: 15 TABLET | Refills: 0 | Status: SHIPPED
Start: 2021-02-24 | End: 2021-11-10

## 2021-02-24 ASSESSMENT — PATIENT HEALTH QUESTIONNAIRE - PHQ9: CLINICAL INTERPRETATION OF PHQ2 SCORE: 0

## 2021-02-24 ASSESSMENT — FIBROSIS 4 INDEX: FIB4 SCORE: 0.87

## 2021-02-24 NOTE — PROGRESS NOTES
Subjective:      Roge Meade is a 35 y.o. male who presents with Other (vaccines for traveling)        CC: Patient is here today for general checkup as well as need for vaccines and oral medication for malaria prophylaxis.    HPI     1. Routine general medical examination at a health care facility  Patient states he feels generally healthy and is taking his antihypertensive medicines and feels his asthma is well controlled.  He will be going on a trip to the [facility] soon and was advised on need for immunizations and malaria prophylaxis.    2. Essential hypertension  Patient currently on losartan 100 mg and at his last visit his amlodipine was increased to 10 mg.  His blood pressure is mildly elevated in the office but improved.  He states typically at home it is in the 120-130 range over 70-80 range.  He denies side effects from medicine    3. Need for Tdap vaccination  Patient due for vaccine    4. Need for hepatitis A vaccination  Patient was advised he needed this series from the vaccine clinic.    5. Need for malaria prophylaxis  Patient states he was advised to go on malaria prophylaxis although the risk is low where he is going to.      Past Medical History:   Diagnosis Date   • Knee joint cyst 2002     Social History     Socioeconomic History   • Marital status:      Spouse name: Not on file   • Number of children: Not on file   • Years of education: Not on file   • Highest education level: Not on file   Occupational History   • Not on file   Tobacco Use   • Smoking status: Never Smoker   • Smokeless tobacco: Never Used   • Tobacco comment: continue to avoid   Substance and Sexual Activity   • Alcohol use: Yes     Alcohol/week: 5.4 oz     Types: 9 Shots of liquor per week   • Drug use: No   • Sexual activity: Yes     Partners: Female     Birth control/protection: Surgical     Comment:    Other Topics Concern   • Not on file   Social History Narrative   • Not on file     Social  "Determinants of Health     Financial Resource Strain:    • Difficulty of Paying Living Expenses:    Food Insecurity:    • Worried About Running Out of Food in the Last Year:    • Ran Out of Food in the Last Year:    Transportation Needs:    • Lack of Transportation (Medical):    • Lack of Transportation (Non-Medical):    Physical Activity:    • Days of Exercise per Week:    • Minutes of Exercise per Session:    Stress:    • Feeling of Stress :    Social Connections:    • Frequency of Communication with Friends and Family:    • Frequency of Social Gatherings with Friends and Family:    • Attends [employer] Services:    • Active Member of Clubs or Organizations:    • Attends Club or Organization Meetings:    • Marital Status:    Intimate Partner Violence:    • Fear of Current or Ex-Partner:    • Emotionally Abused:    • Physically Abused:    • Sexually Abused:      Current Outpatient Medications   Medication Sig Dispense Refill   • atovaquone-proguanil (MALARONE) 250-100 MG per tablet Take 1 tablet by mouth every day. 15 tablet 0   • amLODIPine (NORVASC) 10 MG Tab Take 1 Tab by mouth every day. 90 Tab 3   • losartan (COZAAR) 100 MG Tab Take 1 Tab by mouth every day. 90 Tab 3   • albuterol 108 (90 Base) MCG/ACT Aero Soln inhalation aerosol Inhale 2 Puffs by mouth every 6 hours as needed for Shortness of Breath. 8.5 g 0     No current facility-administered medications for this visit.     Family History   Problem Relation Age of Onset   • Stroke Mother    • Diabetes Mother    • Sleep Apnea Mother    • Hypertension Father    • Cancer Father         bladder   • Alcohol/Drug Father    • Psychiatric Illness Sister         eating disorder   • Asthma Son    • No Known Problems Daughter          Review of Systems   All other systems reviewed and are negative.         Objective:     /74 (BP Location: Right arm, Patient Position: Sitting, BP Cuff Size: Adult)   Pulse 66   Resp 16   Ht 1.905 m (6' 3\")   Wt 106 kg (233 lb) "   SpO2 100%   BMI 29.12 kg/m²      Physical Exam  Vitals and nursing note reviewed.   Constitutional:       General: He is not in acute distress.     Appearance: He is well-developed. He is not diaphoretic.   HENT:      Head: Normocephalic and atraumatic.      Right Ear: External ear normal.      Left Ear: External ear normal.      Nose: Nose normal.   Eyes:      General:         Right eye: No discharge.         Left eye: No discharge.      Conjunctiva/sclera: Conjunctivae normal.   Neck:      Thyroid: No thyromegaly.      Trachea: No tracheal deviation.   Cardiovascular:      Rate and Rhythm: Normal rate and regular rhythm.      Heart sounds: Normal heart sounds. No murmur.   Pulmonary:      Effort: Pulmonary effort is normal. No respiratory distress.      Breath sounds: Normal breath sounds. No wheezing or rales.   Musculoskeletal:      Cervical back: Normal range of motion and neck supple.   Lymphadenopathy:      Cervical: No cervical adenopathy.   Skin:     General: Skin is warm and dry.      Findings: No erythema or rash.   Neurological:      Mental Status: He is alert and oriented to person, place, and time.      Coordination: Coordination normal.   Psychiatric:         Behavior: Behavior normal.         Thought Content: Thought content normal.         Judgment: Judgment normal.                 Assessment/Plan:        1. Routine general medical examination at a health care facility  Patient reminded to try to stay active, take his medications regularly and avoid secondhand smoke and overuse of alcohol.    2. Essential hypertension  Blood pressure at home reported to be better than the readings in the office today.  He will continue to monitor with a goal of 130/80 or less.  I told him that if he does run high despite his current medicines, I would then recommend an echocardiogram and possibly a referral to the blood pressure clinic because of his hypertension at such a young age.  There is a family history  "of hypertension    3. Need for Tdap vaccination  I have placed the below orders and discussed them with an approved delegating provider. The MA is performing the below orders under the direction of Dr. Kenji MD.    - Tdap =>8yo IM    4. Need for hepatitis A vaccination  I have placed the below orders and discussed them with an approved delegating provider. The MA is performing the below orders under the direction of Dr. Kenji GARCIA.    - Hep A Adult 19+    5. Need for malaria prophylaxis  I showed patient the recommendations and warnings on \"up-to-date\" regarding the medication and should he decide to use it, I advised him to start it 1 day before his visit and then daily throughout the visit and then daily for 1 week after his trip.  - atovaquone-proguanil (MALARONE) 250-100 MG per tablet; Take 1 tablet by mouth every day.  Dispense: 15 tablet; Refill: 0    "

## 2021-11-10 ENCOUNTER — OFFICE VISIT (OUTPATIENT)
Dept: MEDICAL GROUP | Facility: MEDICAL CENTER | Age: 36
End: 2021-11-10
Payer: COMMERCIAL

## 2021-11-10 VITALS
RESPIRATION RATE: 16 BRPM | OXYGEN SATURATION: 96 % | HEART RATE: 74 BPM | DIASTOLIC BLOOD PRESSURE: 78 MMHG | TEMPERATURE: 97.6 F | BODY MASS INDEX: 28.35 KG/M2 | WEIGHT: 228 LBS | HEIGHT: 75 IN | SYSTOLIC BLOOD PRESSURE: 132 MMHG

## 2021-11-10 DIAGNOSIS — Z23 NEED FOR VACCINATION: ICD-10-CM

## 2021-11-10 DIAGNOSIS — R19.5 CHANGE IN STOOL: ICD-10-CM

## 2021-11-10 DIAGNOSIS — I10 ESSENTIAL HYPERTENSION: ICD-10-CM

## 2021-11-10 DIAGNOSIS — Z13.6 SCREENING FOR CARDIOVASCULAR CONDITION: ICD-10-CM

## 2021-11-10 PROBLEM — T78.40XA ALLERGIES: Status: RESOLVED | Noted: 2020-05-13 | Resolved: 2021-11-10

## 2021-11-10 PROBLEM — J45.990 EXERCISE-INDUCED ASTHMA: Status: RESOLVED | Noted: 2019-03-15 | Resolved: 2021-11-10

## 2021-11-10 PROCEDURE — 99214 OFFICE O/P EST MOD 30 MIN: CPT | Mod: 25 | Performed by: FAMILY MEDICINE

## 2021-11-10 PROCEDURE — 93000 ELECTROCARDIOGRAM COMPLETE: CPT | Performed by: FAMILY MEDICINE

## 2021-11-10 PROCEDURE — 90686 IIV4 VACC NO PRSV 0.5 ML IM: CPT | Performed by: FAMILY MEDICINE

## 2021-11-10 PROCEDURE — 90471 IMMUNIZATION ADMIN: CPT | Performed by: FAMILY MEDICINE

## 2021-11-10 RX ORDER — AMLODIPINE BESYLATE 10 MG/1
10 TABLET ORAL DAILY
Qty: 90 TABLET | Refills: 3 | Status: SHIPPED | OUTPATIENT
Start: 2021-11-10 | End: 2022-04-14 | Stop reason: SDUPTHER

## 2021-11-10 RX ORDER — LOSARTAN POTASSIUM AND HYDROCHLOROTHIAZIDE 12.5; 1 MG/1; MG/1
1 TABLET ORAL DAILY
Qty: 90 TABLET | Refills: 1 | Status: CANCELLED | OUTPATIENT
Start: 2021-11-10

## 2021-11-10 RX ORDER — LOSARTAN POTASSIUM AND HYDROCHLOROTHIAZIDE 12.5; 1 MG/1; MG/1
1 TABLET ORAL DAILY
Qty: 90 TABLET | Refills: 1 | Status: SHIPPED | OUTPATIENT
Start: 2021-11-10 | End: 2022-04-14 | Stop reason: SDUPTHER

## 2021-11-10 ASSESSMENT — FIBROSIS 4 INDEX: FIB4 SCORE: 0.9

## 2021-11-10 NOTE — PROGRESS NOTES
"  Subjective:     Roge Meade is a 36 y.o. male presenting to [facility] care:    1. Hypertension: He reports being diagnosed with hypertension around age 30. Is currently on amlodipine 10 mg daily, losartan 100 mg daily. He reports trying lisinopril in the past, which caused a cough. He checks his blood pressures at home, range 115-130s/90s-100s. Denies any chest pain, shortness of breath, lightheadedness, dizziness, leg swelling. He does exercise regularly, runs 4 miles daily. He overall eats healthy. Minimal alcohol intake. He reports a family history of hypertension.    2. Stool changes: About a month ago, he noticed changes in his stool. Is having smaller, harder pieces with occasional very loose stools. He has had increased gas. Denies any pain, blood in the stools, urinary symptoms, dietary changes. He did travel to the [facility] in May. He reports doing a colonoscopy several years ago, polyps were removed at that time. He states that he did the colonoscopy several years ago as he was undergoing screening for bladder cancer. He was concerned at that time since his father  from bladder cancer.      Current Outpatient Medications:   •  losartan-hydrochlorothiazide (HYZAAR) 100-12.5 MG per tablet, Take 1 Tablet by mouth every day., Disp: 90 Tablet, Rfl: 1  •  amLODIPine (NORVASC) 10 MG Tab, Take 1 Tablet by mouth every day., Disp: 90 Tablet, Rfl: 3    Objective:     Vitals: /78   Pulse 74   Temp 36.4 °C (97.6 °F)   Resp 16   Ht 1.905 m (6' 3\")   Wt 103 kg (228 lb)   SpO2 96%   BMI 28.50 kg/m²   General: Alert  HEENT: Normocephalic.   Heart: Regular rate and rhythm.  S1 and S2 normal.  No murmurs appreciated.  Respiratory: Normal respiratory effort.  Clear to auscultation bilaterally.  Abdomen: Non-distended, soft  Extremities: No leg edema.    EKG: Normal sinus rhythm.  Heart rate 73 bpm.    Assessment/Plan:     Diagnoses and all orders for this visit:    Essential hypertension  Chronic, " somewhat uncontrolled.  Given his readings at home, we discussed adding hydrochlorothiazide to his regimen.  We will check the following labs as well.  EKG was normal.  A CT scan several years ago showed normal kidneys.  Follow-up in 4 to 6 months.  -     CBC WITHOUT DIFFERENTIAL; Future  -     Comp Metabolic Panel; Future  -     TSH WITH REFLEX TO FT4; Future  -     URINALYSIS,CULTURE IF INDICATED; Future  -     MICROALBUMIN CREAT RATIO URINE; Future  -     ALDOSTERONE; Future  -     EKG  -     losartan-hydrochlorothiazide (HYZAAR) 100-12.5 MG per tablet; Take 1 Tablet by mouth every day.  -     amLODIPine (NORVASC) 10 MG Tab; Take 1 Tablet by mouth every day.    Change in stool  Self-limited issue.  We discussed possible constipation.  Discussed staying well hydrated, trying MiraLAX for a week or so, probiotics.    Screening for cardiovascular condition  -     Lipid Profile; Future    Need for vaccination  -     INFLUENZA VACCINE QUAD INJ (PF)          Return in about 4 months (around 3/10/2022) for Med check.

## 2021-11-18 ENCOUNTER — HOSPITAL ENCOUNTER (OUTPATIENT)
Dept: LAB | Facility: MEDICAL CENTER | Age: 36
End: 2021-11-18
Attending: FAMILY MEDICINE
Payer: COMMERCIAL

## 2021-11-18 DIAGNOSIS — I10 ESSENTIAL HYPERTENSION: ICD-10-CM

## 2021-11-18 DIAGNOSIS — Z13.6 SCREENING FOR CARDIOVASCULAR CONDITION: ICD-10-CM

## 2021-11-18 LAB
ALBUMIN SERPL BCP-MCNC: 4.5 G/DL (ref 3.2–4.9)
ALBUMIN/GLOB SERPL: 1.5 G/DL
ALP SERPL-CCNC: 83 U/L (ref 30–99)
ALT SERPL-CCNC: 24 U/L (ref 2–50)
ANION GAP SERPL CALC-SCNC: 9 MMOL/L (ref 7–16)
APPEARANCE UR: CLEAR
AST SERPL-CCNC: 24 U/L (ref 12–45)
BILIRUB SERPL-MCNC: 0.6 MG/DL (ref 0.1–1.5)
BILIRUB UR QL STRIP.AUTO: NEGATIVE
BUN SERPL-MCNC: 21 MG/DL (ref 8–22)
CALCIUM SERPL-MCNC: 9 MG/DL (ref 8.5–10.5)
CHLORIDE SERPL-SCNC: 104 MMOL/L (ref 96–112)
CHOLEST SERPL-MCNC: 187 MG/DL (ref 100–199)
CO2 SERPL-SCNC: 26 MMOL/L (ref 20–33)
COLOR UR: YELLOW
CREAT SERPL-MCNC: 0.92 MG/DL (ref 0.5–1.4)
CREAT UR-MCNC: 209.83 MG/DL
ERYTHROCYTE [DISTWIDTH] IN BLOOD BY AUTOMATED COUNT: 43.4 FL (ref 35.9–50)
FASTING STATUS PATIENT QL REPORTED: NORMAL
GLOBULIN SER CALC-MCNC: 3 G/DL (ref 1.9–3.5)
GLUCOSE SERPL-MCNC: 100 MG/DL (ref 65–99)
GLUCOSE UR STRIP.AUTO-MCNC: NEGATIVE MG/DL
HCT VFR BLD AUTO: 45.8 % (ref 42–52)
HDLC SERPL-MCNC: 39 MG/DL
HGB BLD-MCNC: 14.9 G/DL (ref 14–18)
KETONES UR STRIP.AUTO-MCNC: NEGATIVE MG/DL
LDLC SERPL CALC-MCNC: 128 MG/DL
LEUKOCYTE ESTERASE UR QL STRIP.AUTO: NEGATIVE
MCH RBC QN AUTO: 31 PG (ref 27–33)
MCHC RBC AUTO-ENTMCNC: 32.5 G/DL (ref 33.7–35.3)
MCV RBC AUTO: 95.4 FL (ref 81.4–97.8)
MICRO URNS: NORMAL
MICROALBUMIN UR-MCNC: <1.2 MG/DL
MICROALBUMIN/CREAT UR: NORMAL MG/G (ref 0–30)
NITRITE UR QL STRIP.AUTO: NEGATIVE
PH UR STRIP.AUTO: 6.5 [PH] (ref 5–8)
PLATELET # BLD AUTO: 260 K/UL (ref 164–446)
PMV BLD AUTO: 10.6 FL (ref 9–12.9)
POTASSIUM SERPL-SCNC: 4 MMOL/L (ref 3.6–5.5)
PROT SERPL-MCNC: 7.5 G/DL (ref 6–8.2)
PROT UR QL STRIP: NEGATIVE MG/DL
RBC # BLD AUTO: 4.8 M/UL (ref 4.7–6.1)
RBC UR QL AUTO: NEGATIVE
SODIUM SERPL-SCNC: 139 MMOL/L (ref 135–145)
SP GR UR STRIP.AUTO: 1.03
TRIGL SERPL-MCNC: 99 MG/DL (ref 0–149)
TSH SERPL DL<=0.005 MIU/L-ACNC: 1.65 UIU/ML (ref 0.38–5.33)
UROBILINOGEN UR STRIP.AUTO-MCNC: 0.2 MG/DL
WBC # BLD AUTO: 5.1 K/UL (ref 4.8–10.8)

## 2021-11-18 PROCEDURE — 80053 COMPREHEN METABOLIC PANEL: CPT

## 2021-11-18 PROCEDURE — 84443 ASSAY THYROID STIM HORMONE: CPT

## 2021-11-18 PROCEDURE — 80061 LIPID PANEL: CPT

## 2021-11-18 PROCEDURE — 36415 COLL VENOUS BLD VENIPUNCTURE: CPT

## 2021-11-18 PROCEDURE — 81003 URINALYSIS AUTO W/O SCOPE: CPT

## 2021-11-18 PROCEDURE — 82088 ASSAY OF ALDOSTERONE: CPT

## 2021-11-18 PROCEDURE — 82043 UR ALBUMIN QUANTITATIVE: CPT

## 2021-11-18 PROCEDURE — 85027 COMPLETE CBC AUTOMATED: CPT

## 2021-11-18 PROCEDURE — 82570 ASSAY OF URINE CREATININE: CPT

## 2021-11-20 LAB — ALDOST SERPL-MCNC: 13 NG/DL

## 2021-12-23 ENCOUNTER — OFFICE VISIT (OUTPATIENT)
Dept: URGENT CARE | Facility: PHYSICIAN GROUP | Age: 36
End: 2021-12-23
Payer: COMMERCIAL

## 2021-12-23 VITALS
HEART RATE: 94 BPM | TEMPERATURE: 97.6 F | HEIGHT: 76 IN | DIASTOLIC BLOOD PRESSURE: 82 MMHG | RESPIRATION RATE: 20 BRPM | WEIGHT: 225 LBS | SYSTOLIC BLOOD PRESSURE: 140 MMHG | BODY MASS INDEX: 27.4 KG/M2 | OXYGEN SATURATION: 97 %

## 2021-12-23 DIAGNOSIS — R05.9 COUGH: ICD-10-CM

## 2021-12-23 DIAGNOSIS — J02.9 SORE THROAT: ICD-10-CM

## 2021-12-23 DIAGNOSIS — J34.89 NASAL CONGESTION WITH RHINORRHEA: ICD-10-CM

## 2021-12-23 DIAGNOSIS — R09.81 NASAL CONGESTION WITH RHINORRHEA: ICD-10-CM

## 2021-12-23 DIAGNOSIS — J22 LOWER RESPIRATORY INFECTION: ICD-10-CM

## 2021-12-23 LAB
FLUAV+FLUBV AG SPEC QL IA: NEGATIVE
INT CON NEG: NEGATIVE
INT CON NEG: NEGATIVE
INT CON POS: POSITIVE
INT CON POS: POSITIVE
S PYO AG THROAT QL: NEGATIVE

## 2021-12-23 PROCEDURE — 87804 INFLUENZA ASSAY W/OPTIC: CPT | Performed by: NURSE PRACTITIONER

## 2021-12-23 PROCEDURE — 87880 STREP A ASSAY W/OPTIC: CPT | Performed by: NURSE PRACTITIONER

## 2021-12-23 PROCEDURE — 99214 OFFICE O/P EST MOD 30 MIN: CPT | Performed by: NURSE PRACTITIONER

## 2021-12-23 RX ORDER — CODEINE PHOSPHATE AND GUAIFENESIN 10; 100 MG/5ML; MG/5ML
5 SOLUTION ORAL EVERY 4 HOURS PRN
Qty: 210 ML | Refills: 0 | Status: SHIPPED | OUTPATIENT
Start: 2021-12-23 | End: 2021-12-30

## 2021-12-23 RX ORDER — DOXYCYCLINE HYCLATE 100 MG
100 TABLET ORAL 2 TIMES DAILY
Qty: 14 TABLET | Refills: 0 | Status: CANCELLED | OUTPATIENT
Start: 2021-12-23 | End: 2021-12-30

## 2021-12-23 RX ORDER — DOXYCYCLINE HYCLATE 100 MG
100 TABLET ORAL 2 TIMES DAILY
Qty: 14 TABLET | Refills: 0 | Status: SHIPPED | OUTPATIENT
Start: 2021-12-23 | End: 2021-12-30

## 2021-12-23 RX ORDER — BENZONATATE 100 MG/1
100 CAPSULE ORAL 3 TIMES DAILY PRN
Qty: 60 CAPSULE | Refills: 0 | Status: SHIPPED
Start: 2021-12-23 | End: 2022-04-14

## 2021-12-23 ASSESSMENT — ENCOUNTER SYMPTOMS
SINUS PAIN: 0
WHEEZING: 0
COUGH: 1
SHORTNESS OF BREATH: 0
CHILLS: 0
CONSTIPATION: 0
SPUTUM PRODUCTION: 1
FEVER: 0
EYE REDNESS: 0
SORE THROAT: 1
WEAKNESS: 0
NECK PAIN: 0
DIARRHEA: 0
CARDIOVASCULAR NEGATIVE: 1
HEADACHES: 0
DIZZINESS: 0
MYALGIAS: 0
EYE DISCHARGE: 0
ABDOMINAL PAIN: 0
VOMITING: 0
NAUSEA: 0

## 2021-12-23 ASSESSMENT — FIBROSIS 4 INDEX: FIB4 SCORE: 0.68

## 2021-12-24 NOTE — PROGRESS NOTES
"Subjective     Roge Meade is a 36 y.o. male who presents with Illness (i6rcuhn sore throat, headache, sharp chest congestion, cough , fever , chills , worse at night , got a covid test 2 weeks and came out negative, and took another 2 days ago and was negative )            HPI   States nasal congestion, cough, phlegm \"dark green/brown phlegm\". No history of asthma or wheeze. No nausea/vomiting, diarrhea or abdominal pain, sore throat from post nasal drainage and cough. Taking over the counter med: plain Mucinex. Coricidin HBC. Keeping hydrated. Taking Emergen-C. History of pneumonia as child. At home test COVID: NEG 2 days ago. No COVID infection in past. History of HTN. COVID vaccinated without booster yet.     PMH:  has a past medical history of Knee joint cyst (2002).  MEDS:   Current Outpatient Medications:   •  losartan-hydrochlorothiazide (HYZAAR) 100-12.5 MG per tablet, Take 1 Tablet by mouth every day., Disp: 90 Tablet, Rfl: 1  •  amLODIPine (NORVASC) 10 MG Tab, Take 1 Tablet by mouth every day., Disp: 90 Tablet, Rfl: 3  ALLERGIES:   Allergies   Allergen Reactions   • Lisinopril Cough   • Sulfa Drugs Anaphylaxis     SURGHX:   Past Surgical History:   Procedure Laterality Date   • CYST EXCISION Right 2002    knee   • FASCIOTOMY Bilateral 2001    secondary to calf hypertrophy     SOCHX:  reports that he has never smoked. He has never used smokeless tobacco. He reports current alcohol use of about 5.4 oz of alcohol per week. He reports that he does not use drugs.  FH: Family history was reviewed, no pertinent findings to report    Review of Systems   Constitutional: Positive for malaise/fatigue. Negative for chills and fever.   HENT: Positive for congestion, ear pain and sore throat. Negative for sinus pain.    Eyes: Negative for discharge and redness.   Respiratory: Positive for cough and sputum production. Negative for shortness of breath and wheezing.    Cardiovascular: Negative.  " "  Gastrointestinal: Negative for abdominal pain, constipation, diarrhea, nausea and vomiting.   Musculoskeletal: Negative for myalgias and neck pain.   Skin: Negative for itching and rash.   Neurological: Negative for dizziness, weakness and headaches.   Endo/Heme/Allergies: Negative for environmental allergies.   All other systems reviewed and are negative.             Objective     /82 (BP Location: Right arm, Patient Position: Sitting, BP Cuff Size: Large adult)   Pulse 94   Temp 36.4 °C (97.6 °F) (Temporal)   Resp 20   Ht 1.93 m (6' 4\")   Wt 102 kg (225 lb)   SpO2 97%   BMI 27.39 kg/m²      Physical Exam  Vitals reviewed.   Constitutional:       General: He is awake. He is not in acute distress.     Appearance: He is well-developed. He is not ill-appearing, toxic-appearing or diaphoretic.      Comments: Appears fatigued.    HENT:      Head: Normocephalic.      Right Ear: Ear canal and external ear normal. A middle ear effusion is present.      Left Ear: Ear canal and external ear normal. A middle ear effusion is present.      Nose: Congestion and rhinorrhea present.      Mouth/Throat:      Lips: Pink.      Mouth: Mucous membranes are dry.      Pharynx: Uvula midline. Posterior oropharyngeal erythema present. No pharyngeal swelling, oropharyngeal exudate or uvula swelling.      Tonsils: No tonsillar exudate or tonsillar abscesses. 1+ on the right. 1+ on the left.   Eyes:      Conjunctiva/sclera: Conjunctivae normal.      Pupils: Pupils are equal, round, and reactive to light.   Cardiovascular:      Rate and Rhythm: Normal rate.   Pulmonary:      Effort: Pulmonary effort is normal. No tachypnea, accessory muscle usage or respiratory distress.      Breath sounds: Normal breath sounds and air entry. No stridor, decreased air movement or transmitted upper airway sounds. No decreased breath sounds, wheezing, rhonchi or rales.   Musculoskeletal:         General: Normal range of motion.      Cervical back: " Normal range of motion and neck supple.   Skin:     General: Skin is warm and dry.   Neurological:      Mental Status: He is alert and oriented to person, place, and time.   Psychiatric:         Attention and Perception: Attention normal.         Mood and Affect: Mood normal.         Speech: Speech normal.         Behavior: Behavior normal. Behavior is cooperative.                             Assessment & Plan                1. Nasal congestion with rhinorrhea    - POCT Influenza A/B    2. Cough    - POCT Influenza A/B  - guaifenesin-codeine (ROBITUSSIN AC) Solution oral solution; Take 5 mL by mouth every four hours as needed for Cough for up to 7 days. Causes drowsiness, do not drive or work while using. Caution with use with other sedating medications.  Dispense: 210 mL; Refill: 0  - benzonatate (TESSALON) 100 MG Cap; Take 1 Capsule by mouth 3 times a day as needed for Cough.  Dispense: 60 Capsule; Refill: 0    3. Sore throat    - POCT Rapid Strep A  - POCT Influenza A/B    4. Lower respiratory infection    - doxycycline (VIBRAMYCIN) 100 MG Tab; Take 1 Tablet by mouth 2 times a day for 7 days.  Dispense: 14 Tablet; Refill: 0    -Maintain water intake  -May use humidifier/vaporizer at home  -Gargle salt water or throat lozenges as needed for throat irritation/dry cough  -Get rest  -May use daily longer acting antihistamine as needed (no decongestant) for post nasal drainage   -May use saline nasal spray/flonase as needed to flush any nasal congestion/post nasal drainage   -May continue over the counter plain Mucinex as needed for productive cough  -Monitor for fevers, worse cough, phlegm, shortness of breath, wheeze, chest tightness, wheeze, lethargy- need re-evaluation

## 2022-04-14 ENCOUNTER — OFFICE VISIT (OUTPATIENT)
Dept: MEDICAL GROUP | Facility: MEDICAL CENTER | Age: 37
End: 2022-04-14
Payer: COMMERCIAL

## 2022-04-14 VITALS
RESPIRATION RATE: 16 BRPM | DIASTOLIC BLOOD PRESSURE: 80 MMHG | OXYGEN SATURATION: 98 % | TEMPERATURE: 97.8 F | SYSTOLIC BLOOD PRESSURE: 138 MMHG | HEIGHT: 76 IN | BODY MASS INDEX: 28.37 KG/M2 | HEART RATE: 78 BPM | WEIGHT: 233 LBS

## 2022-04-14 DIAGNOSIS — Z12.11 SCREEN FOR COLON CANCER: ICD-10-CM

## 2022-04-14 DIAGNOSIS — I10 ESSENTIAL HYPERTENSION: ICD-10-CM

## 2022-04-14 PROCEDURE — 99213 OFFICE O/P EST LOW 20 MIN: CPT | Performed by: FAMILY MEDICINE

## 2022-04-14 RX ORDER — AMLODIPINE BESYLATE 10 MG/1
10 TABLET ORAL DAILY
Qty: 90 TABLET | Refills: 3 | Status: SHIPPED | OUTPATIENT
Start: 2022-04-14 | End: 2023-05-16 | Stop reason: SDUPTHER

## 2022-04-14 RX ORDER — LOSARTAN POTASSIUM AND HYDROCHLOROTHIAZIDE 12.5; 1 MG/1; MG/1
1 TABLET ORAL DAILY
Qty: 90 TABLET | Refills: 3 | Status: SHIPPED | OUTPATIENT
Start: 2022-04-14 | End: 2023-05-16 | Stop reason: SDUPTHER

## 2022-04-14 ASSESSMENT — FIBROSIS 4 INDEX: FIB4 SCORE: 0.68

## 2022-04-14 ASSESSMENT — PATIENT HEALTH QUESTIONNAIRE - PHQ9: CLINICAL INTERPRETATION OF PHQ2 SCORE: 0

## 2022-04-14 NOTE — PROGRESS NOTES
"  Subjective:     Roge Meade is a 36 y.o. male presenting for a follow-up        Current Outpatient Medications:   •  losartan-hydrochlorothiazide (HYZAAR) 100-12.5 MG per tablet, Take 1 Tablet by mouth every day., Disp: 90 Tablet, Rfl: 3  •  amLODIPine (NORVASC) 10 MG Tab, Take 1 Tablet by mouth every day., Disp: 90 Tablet, Rfl: 3    Objective:     Vitals: /80   Pulse 78   Temp 36.6 °C (97.8 °F)   Resp 16   Ht 1.93 m (6' 4\")   Wt 106 kg (233 lb)   SpO2 98%   BMI 28.36 kg/m²   General: Alert  HEENT: Normocephalic.   Heart: Regular rate and rhythm.  S1 and S2 normal.  No murmurs appreciated.  Respiratory: Normal respiratory effort.  Clear to auscultation bilaterally.  Abdomen: Non-distended, soft  Extremities: No leg edema.    Assessment/Plan:     Roge was seen today for follow-up.    Diagnoses and all orders for this visit:    Essential hypertension  Chronic, stable, continue current medications.  Is not have any side effects, blood pressures are mostly well controlled at home.  He does have a fair amount of stress with work, is working long hours, etc.  Follow-up in 6 months  -     losartan-hydrochlorothiazide (HYZAAR) 100-12.5 MG per tablet; Take 1 Tablet by mouth every day.  -     amLODIPine (NORVASC) 10 MG Tab; Take 1 Tablet by mouth every day.    Screen for colon cancer  -     Referral to GI for Colonoscopy          Return in about 6 months (around 10/14/2022) for routine follow up.    "

## 2022-08-10 ENCOUNTER — OFFICE VISIT (OUTPATIENT)
Dept: URGENT CARE | Facility: PHYSICIAN GROUP | Age: 37
End: 2022-08-10
Payer: COMMERCIAL

## 2022-08-10 ENCOUNTER — HOSPITAL ENCOUNTER (OUTPATIENT)
Facility: MEDICAL CENTER | Age: 37
End: 2022-08-10
Payer: COMMERCIAL

## 2022-08-10 ENCOUNTER — HOSPITAL ENCOUNTER (OUTPATIENT)
Dept: RADIOLOGY | Facility: MEDICAL CENTER | Age: 37
End: 2022-08-10
Payer: COMMERCIAL

## 2022-08-10 VITALS
BODY MASS INDEX: 28.37 KG/M2 | HEIGHT: 76 IN | SYSTOLIC BLOOD PRESSURE: 134 MMHG | RESPIRATION RATE: 18 BRPM | WEIGHT: 233 LBS | HEART RATE: 95 BPM | OXYGEN SATURATION: 99 % | DIASTOLIC BLOOD PRESSURE: 86 MMHG | TEMPERATURE: 98.2 F

## 2022-08-10 DIAGNOSIS — R20.0 NUMBNESS AND TINGLING IN BOTH HANDS: ICD-10-CM

## 2022-08-10 DIAGNOSIS — R20.2 NUMBNESS AND TINGLING IN BOTH HANDS: ICD-10-CM

## 2022-08-10 DIAGNOSIS — R30.0 DYSURIA: ICD-10-CM

## 2022-08-10 PROCEDURE — 81003 URINALYSIS AUTO W/O SCOPE: CPT

## 2022-08-10 PROCEDURE — 87086 URINE CULTURE/COLONY COUNT: CPT

## 2022-08-10 PROCEDURE — 74019 RADEX ABDOMEN 2 VIEWS: CPT

## 2022-08-10 PROCEDURE — 99214 OFFICE O/P EST MOD 30 MIN: CPT

## 2022-08-10 PROCEDURE — 72040 X-RAY EXAM NECK SPINE 2-3 VW: CPT

## 2022-08-10 ASSESSMENT — FIBROSIS 4 INDEX: FIB4 SCORE: 0.7

## 2022-08-10 NOTE — PROGRESS NOTES
"Subjective:   Roge Meade is a 37 y.o. male who presents for Back Pain (Lower back ache ), Dysuria, and Tingling (In the hands )      HPI:  Patient presents with complaints of low back pain, dysuria and secondary complaint of tingling in bilateral hands.     Urinary restriction  Low back pain improved with activity, daily running 4.5 miles per day  Went to  over the weekend and did increase his physical activity  Urinated yesterday - reports cloudy urine and sharp pain, chalky stain on his boxers, urinary frequency, urinary retention. Denies hematuria.    Reports he \"partied\" over the weekend with exacerbation of symptoms.     Has marisabel examined by urology and GI 2.5 years ago for similar complaints.   Examinations were normal. Was seen by GI for colonoscopy due to his father passing away at 52 due to bladder cancer. Did not have elevated PSA, BPH, Stones.    Every couple of weeks for four or five weeks he has numbness and tingling in the fingertips  Also reporting burning sensation on the top of bilateral hands from his knuckles to midforearm. Burning is alleviated lotion application. No rash.   Reports high school football injury in right shoulder - right arm was hypertended and the joint was dislocated.   Denies color changes of his bilateral hands    Works at [employer]  Family history of T2DM      ROS as above per HPI    Medications:    Current Outpatient Medications on File Prior to Visit   Medication Sig Dispense Refill    losartan-hydrochlorothiazide (HYZAAR) 100-12.5 MG per tablet Take 1 Tablet by mouth every day. 90 Tablet 3    amLODIPine (NORVASC) 10 MG Tab Take 1 Tablet by mouth every day. 90 Tablet 3     No current facility-administered medications on file prior to visit.        Allergies:   Lisinopril and Sulfa drugs    Problem List:   Patient Active Problem List   Diagnosis    Essential hypertension        Surgical History:  Past Surgical History:   Procedure Laterality Date    CYST EXCISION " "Right 2002    knee    FASCIOTOMY Bilateral 2001    secondary to calf hypertrophy       Past Social Hx:   Social History     Tobacco Use    Smoking status: Never    Smokeless tobacco: Never    Tobacco comments:     continue to avoid   Vaping Use    Vaping Use: Never used   Substance Use Topics    Alcohol use: Yes     Alcohol/week: 5.4 oz     Types: 9 Shots of liquor per week     Comment: socially     Drug use: No          Problem list, medications, and allergies reviewed by myself today in Epic.     Objective:     /86 (BP Location: Right arm, Patient Position: Sitting, BP Cuff Size: Adult)   Pulse 95   Temp 36.8 °C (98.2 °F) (Temporal)   Resp 18   Ht 1.93 m (6' 4\")   Wt 106 kg (233 lb)   SpO2 99%   BMI 28.36 kg/m²     Physical Exam  Vitals and nursing note reviewed.   Constitutional:       General: He is not in acute distress.     Appearance: Normal appearance.   HENT:      Head: Normocephalic and atraumatic.   Eyes:      Conjunctiva/sclera: Conjunctivae normal.      Pupils: Pupils are equal, round, and reactive to light.   Cardiovascular:      Rate and Rhythm: Normal rate and regular rhythm.      Heart sounds: Normal heart sounds.   Pulmonary:      Effort: Pulmonary effort is normal.      Breath sounds: Normal breath sounds.   Abdominal:      General: Abdomen is flat. Bowel sounds are normal.      Palpations: Abdomen is soft.   Musculoskeletal:         General: Normal range of motion.      Cervical back: No swelling, edema, deformity, erythema, signs of trauma, rigidity, spasms, tenderness, bony tenderness or crepitus. No pain with movement. Normal range of motion.   Neurological:      Mental Status: He is alert.          [id number] VIEWS 08/10/2022    Narrative  8/10/2022 3:42 PM    HISTORY/REASON FOR EXAM:  Bilateral flank pain and dysuria.      TECHNIQUE/EXAM DESCRIPTION AND NUMBER OF VIEWS:  3 view(s) of the abdomen.    COMPARISON: None    FINDINGS:  There are no calcifications suspicious for " urinary tract stones.    There are 2 small calcifications in the right hemipelvis which are probably phleboliths. There are round appearance.    There is a nonobstructive nonspecific bowel gas pattern.  There is no evidence of free intraperitoneal air.    Visualized lung bases are clear.    Bones are unremarkable for age.    Impression  1.  Negative abdominal series.    DX-CERVICAL SPINE-2 OR 3 VIEWS 08/10/2022    Narrative  8/10/2022 3:42 PM    HISTORY/REASON FOR EXAM:  Bilateral intermittent hand tingling for 3 years      TECHNIQUE/EXAM DESCRIPTION AND NUMBER OF VIEWS:  Cervical spine series, 3 views.    COMPARISON:  None.      FINDINGS:  The alignment of the cervical spine is within normal limits.    The vertebral body heights are maintained.    The intervertebral disk spaces are maintained.    There is no significant arthropathy.    Impression  1.  Unremarkable cervical spine series.      Assessment/Plan:     Diagnosis and associated orders:   1. Dysuria  - PO-FMUMFXK-5 VIEWS; Future  - URINE CULTURE(NEW); Future  - URINE MICROSCOPIC (MICROSCOPIC URINALYSIS); Future  - CBC WITH DIFFERENTIAL; Future  - Comp Metabolic Panel; Future  - Lipid Profile; Future  - Referral to Urology  - POCT Urinalysis    2. Numbness and tingling in both hands  - DX-CERVICAL SPINE-2 OR 3 VIEWS; Future  - Referral to Sports Medicine      Comments/MDM:       Patient presents with complaints of low back pain, dysuria and secondary complaint of tingling in bilateral hands.  UA obtained in urgent care today is normal, negative for nitrites, leukocytes, blood.  We will obtain urine culture, urine microscopic analysis and updated lab work.  Abdominal x-ray obtained today to rule out kidney stones did mention in radiology reading that there are 2 small calcifications in the right hemipelvis which are probably phleboliths. There are round appearance. Patient will be referred to urology.  Also refer patient to sports medicine for ongoing  tingling sensation in bilateral hands.  Cervical spine x-rays negative for acute findings. Patient is encouraged to follow-up with his primary care physician to ensure resolution of complaints.  Red flag symptoms with strict return to ER precautions reviewed.          Pt is clinically stable at today's acute urgent care visit.  No acute distress noted. Appropriate for outpatient management at this time.       Discussed DDx, management options (risks,benefits, and alternatives to planned treatment), natural progression and supportive care.  Expressed understanding and the treatment plan was agreed upon. Questions were encouraged and answered   Return to urgent care prn if new or worsening sx or if there is no improvement in condition prn.    Educated in Red flags and indications to immediately call 911 or present to the Emergency Department.   Advised the patient to follow-up with the primary care physician for recheck, reevaluation, and consideration of further management.      Please note that this dictation was created using voice recognition software. I have made a reasonable attempt to correct obvious errors, but I expect that there are errors of grammar and possibly content that I did not discover before finalizing the note.    This note was electronically signed by Dawna Daniels, EVELYN

## 2022-08-11 ENCOUNTER — HOSPITAL ENCOUNTER (OUTPATIENT)
Dept: LAB | Facility: MEDICAL CENTER | Age: 37
End: 2022-08-11
Payer: COMMERCIAL

## 2022-08-11 DIAGNOSIS — R30.0 DYSURIA: ICD-10-CM

## 2022-08-11 LAB
ALBUMIN SERPL BCP-MCNC: 4.8 G/DL (ref 3.2–4.9)
ALBUMIN/GLOB SERPL: 1.8 G/DL
ALP SERPL-CCNC: 60 U/L (ref 30–99)
ALT SERPL-CCNC: 51 U/L (ref 2–50)
ANION GAP SERPL CALC-SCNC: 12 MMOL/L (ref 7–16)
APPEARANCE UR: CLEAR
AST SERPL-CCNC: 37 U/L (ref 12–45)
BASOPHILS # BLD AUTO: 0.4 % (ref 0–1.8)
BASOPHILS # BLD: 0.03 K/UL (ref 0–0.12)
BILIRUB SERPL-MCNC: 1.2 MG/DL (ref 0.1–1.5)
BILIRUB UR QL STRIP.AUTO: NEGATIVE
BUN SERPL-MCNC: 16 MG/DL (ref 8–22)
CALCIUM SERPL-MCNC: 9.8 MG/DL (ref 8.5–10.5)
CHLORIDE SERPL-SCNC: 99 MMOL/L (ref 96–112)
CHOLEST SERPL-MCNC: 229 MG/DL (ref 100–199)
CO2 SERPL-SCNC: 26 MMOL/L (ref 20–33)
COLOR UR: YELLOW
CREAT SERPL-MCNC: 1.14 MG/DL (ref 0.5–1.4)
EOSINOPHIL # BLD AUTO: 0.05 K/UL (ref 0–0.51)
EOSINOPHIL NFR BLD: 0.7 % (ref 0–6.9)
ERYTHROCYTE [DISTWIDTH] IN BLOOD BY AUTOMATED COUNT: 43.4 FL (ref 35.9–50)
FASTING STATUS PATIENT QL REPORTED: NORMAL
GFR SERPLBLD CREATININE-BSD FMLA CKD-EPI: 85 ML/MIN/1.73 M 2
GLOBULIN SER CALC-MCNC: 2.7 G/DL (ref 1.9–3.5)
GLUCOSE SERPL-MCNC: 112 MG/DL (ref 65–99)
GLUCOSE UR STRIP.AUTO-MCNC: NEGATIVE MG/DL
HCT VFR BLD AUTO: 46.6 % (ref 42–52)
HDLC SERPL-MCNC: 44 MG/DL
HGB BLD-MCNC: 15.7 G/DL (ref 14–18)
IMM GRANULOCYTES # BLD AUTO: 0.02 K/UL (ref 0–0.11)
IMM GRANULOCYTES NFR BLD AUTO: 0.3 % (ref 0–0.9)
KETONES UR STRIP.AUTO-MCNC: NEGATIVE MG/DL
LDLC SERPL CALC-MCNC: 161 MG/DL
LEUKOCYTE ESTERASE UR QL STRIP.AUTO: NEGATIVE
LYMPHOCYTES # BLD AUTO: 2.25 K/UL (ref 1–4.8)
LYMPHOCYTES NFR BLD: 31.7 % (ref 22–41)
MCH RBC QN AUTO: 31 PG (ref 27–33)
MCHC RBC AUTO-ENTMCNC: 33.7 G/DL (ref 33.7–35.3)
MCV RBC AUTO: 92.1 FL (ref 81.4–97.8)
MICRO URNS: NORMAL
MONOCYTES # BLD AUTO: 0.56 K/UL (ref 0–0.85)
MONOCYTES NFR BLD AUTO: 7.9 % (ref 0–13.4)
NEUTROPHILS # BLD AUTO: 4.18 K/UL (ref 1.82–7.42)
NEUTROPHILS NFR BLD: 59 % (ref 44–72)
NITRITE UR QL STRIP.AUTO: NEGATIVE
NRBC # BLD AUTO: 0 K/UL
NRBC BLD-RTO: 0 /100 WBC
PH UR STRIP.AUTO: 6 [PH] (ref 5–8)
PLATELET # BLD AUTO: 222 K/UL (ref 164–446)
PMV BLD AUTO: 10.8 FL (ref 9–12.9)
POTASSIUM SERPL-SCNC: 3.8 MMOL/L (ref 3.6–5.5)
PROT SERPL-MCNC: 7.5 G/DL (ref 6–8.2)
PROT UR QL STRIP: NEGATIVE MG/DL
RBC # BLD AUTO: 5.06 M/UL (ref 4.7–6.1)
RBC UR QL AUTO: NEGATIVE
SODIUM SERPL-SCNC: 137 MMOL/L (ref 135–145)
SP GR UR STRIP.AUTO: 1.02
TRIGL SERPL-MCNC: 118 MG/DL (ref 0–149)
UROBILINOGEN UR STRIP.AUTO-MCNC: 0.2 MG/DL
WBC # BLD AUTO: 7.1 K/UL (ref 4.8–10.8)

## 2022-08-11 PROCEDURE — 36415 COLL VENOUS BLD VENIPUNCTURE: CPT

## 2022-08-11 PROCEDURE — 85025 COMPLETE CBC W/AUTO DIFF WBC: CPT

## 2022-08-11 PROCEDURE — 80061 LIPID PANEL: CPT

## 2022-08-11 PROCEDURE — 80053 COMPREHEN METABOLIC PANEL: CPT

## 2022-08-13 LAB
BACTERIA UR CULT: NORMAL
SIGNIFICANT IND 70042: NORMAL
SITE SITE: NORMAL
SOURCE SOURCE: NORMAL

## 2022-08-17 ENCOUNTER — OFFICE VISIT (OUTPATIENT)
Dept: MEDICAL GROUP | Facility: MEDICAL CENTER | Age: 37
End: 2022-08-17
Payer: COMMERCIAL

## 2022-08-17 VITALS
DIASTOLIC BLOOD PRESSURE: 72 MMHG | WEIGHT: 228 LBS | HEIGHT: 76 IN | SYSTOLIC BLOOD PRESSURE: 122 MMHG | BODY MASS INDEX: 27.76 KG/M2 | HEART RATE: 74 BPM | RESPIRATION RATE: 16 BRPM | TEMPERATURE: 97.6 F | OXYGEN SATURATION: 96 %

## 2022-08-17 DIAGNOSIS — R10.9 FLANK PAIN: ICD-10-CM

## 2022-08-17 DIAGNOSIS — R30.0 DYSURIA: ICD-10-CM

## 2022-08-17 DIAGNOSIS — R35.0 URINARY FREQUENCY: ICD-10-CM

## 2022-08-17 PROCEDURE — 99213 OFFICE O/P EST LOW 20 MIN: CPT | Performed by: FAMILY MEDICINE

## 2022-08-17 ASSESSMENT — FIBROSIS 4 INDEX: FIB4 SCORE: 0.86

## 2022-08-17 NOTE — PROGRESS NOTES
"  Subjective:     Roge Meade is a 37 y.o. male presenting for an urgent care follow up          Current Outpatient Medications:     losartan-hydrochlorothiazide (HYZAAR) 100-12.5 MG per tablet, Take 1 Tablet by mouth every day., Disp: 90 Tablet, Rfl: 3    amLODIPine (NORVASC) 10 MG Tab, Take 1 Tablet by mouth every day., Disp: 90 Tablet, Rfl: 3    Objective:     Vitals: /72   Pulse 74   Temp 36.4 °C (97.6 °F)   Resp 16   Ht 1.93 m (6' 4\")   Wt 103 kg (228 lb)   SpO2 96%   BMI 27.75 kg/m²   General: Alert  HEENT: Normocephalic.   Assessment/Plan:     Roge was seen today for follow-up.    Diagnoses and all orders for this visit:    Dysuria  Urinary frequency  Flank pain  Acute, uncomplicated issue.  Urgent care notes reviewed, he reports that his symptoms have been gradually improving.  He plans to schedule with urology soon.  We will check a CT scan, his symptoms are suspicious for kidney stones.  -     CT-RENAL COLIC EVALUATION(A/P W/O); Future      "

## 2022-09-02 ENCOUNTER — TELEPHONE (OUTPATIENT)
Dept: MEDICAL GROUP | Facility: MEDICAL CENTER | Age: 37
End: 2022-09-02
Payer: COMMERCIAL

## 2022-09-02 DIAGNOSIS — R10.9 FLANK PAIN: ICD-10-CM

## 2022-09-02 DIAGNOSIS — N28.89 OTHER SPECIFIED DISORDERS OF KIDNEY AND URETER: ICD-10-CM

## 2022-09-02 DIAGNOSIS — R35.0 URINARY FREQUENCY: ICD-10-CM

## 2022-09-02 DIAGNOSIS — R30.0 DYSURIA: ICD-10-CM

## 2022-09-02 NOTE — TELEPHONE ENCOUNTER
Pt called stating his insurance wont cover the ct renal w/o, that he needs the order to be done w/ contrast. Pt asking if order can be reordered to be with contrast    Please advise

## 2022-09-19 ENCOUNTER — TELEPHONE (OUTPATIENT)
Dept: MEDICAL GROUP | Facility: MEDICAL CENTER | Age: 37
End: 2022-09-19
Payer: COMMERCIAL

## 2023-05-16 ENCOUNTER — OFFICE VISIT (OUTPATIENT)
Dept: MEDICAL GROUP | Facility: MEDICAL CENTER | Age: 38
End: 2023-05-16
Payer: COMMERCIAL

## 2023-05-16 VITALS
SYSTOLIC BLOOD PRESSURE: 124 MMHG | HEIGHT: 76 IN | OXYGEN SATURATION: 99 % | BODY MASS INDEX: 28.62 KG/M2 | HEART RATE: 78 BPM | RESPIRATION RATE: 16 BRPM | DIASTOLIC BLOOD PRESSURE: 74 MMHG | WEIGHT: 235 LBS | TEMPERATURE: 97.6 F

## 2023-05-16 DIAGNOSIS — E78.5 DYSLIPIDEMIA: ICD-10-CM

## 2023-05-16 DIAGNOSIS — Z11.59 NEED FOR HEPATITIS C SCREENING TEST: ICD-10-CM

## 2023-05-16 DIAGNOSIS — I10 ESSENTIAL HYPERTENSION: ICD-10-CM

## 2023-05-16 PROCEDURE — 1125F AMNT PAIN NOTED PAIN PRSNT: CPT | Performed by: FAMILY MEDICINE

## 2023-05-16 PROCEDURE — 3074F SYST BP LT 130 MM HG: CPT | Performed by: FAMILY MEDICINE

## 2023-05-16 PROCEDURE — 99213 OFFICE O/P EST LOW 20 MIN: CPT | Performed by: FAMILY MEDICINE

## 2023-05-16 PROCEDURE — 3078F DIAST BP <80 MM HG: CPT | Performed by: FAMILY MEDICINE

## 2023-05-16 RX ORDER — LOSARTAN POTASSIUM AND HYDROCHLOROTHIAZIDE 12.5; 1 MG/1; MG/1
1 TABLET ORAL DAILY
Qty: 90 TABLET | Refills: 3 | Status: SHIPPED | OUTPATIENT
Start: 2023-05-16

## 2023-05-16 RX ORDER — AMLODIPINE BESYLATE 10 MG/1
10 TABLET ORAL DAILY
Qty: 90 TABLET | Refills: 3 | Status: SHIPPED | OUTPATIENT
Start: 2023-05-16

## 2023-05-16 ASSESSMENT — FIBROSIS 4 INDEX: FIB4 SCORE: 0.86

## 2023-05-16 ASSESSMENT — PATIENT HEALTH QUESTIONNAIRE - PHQ9: CLINICAL INTERPRETATION OF PHQ2 SCORE: 0

## 2023-05-16 NOTE — PROGRESS NOTES
"  Subjective:     Roge Meade is a 37 y.o. male presenting for a follow-up        Current Outpatient Medications:     losartan-hydrochlorothiazide (HYZAAR) 100-12.5 MG per tablet, Take 1 Tablet by mouth every day., Disp: 90 Tablet, Rfl: 3    amLODIPine (NORVASC) 10 MG Tab, Take 1 Tablet by mouth every day., Disp: 90 Tablet, Rfl: 3    Objective:     Vitals: /74   Pulse 78   Temp 36.4 °C (97.6 °F)   Resp 16   Ht 1.93 m (6' 4\")   Wt 107 kg (235 lb)   SpO2 99%   BMI 28.61 kg/m²   General: Alert  HEENT: Normocephalic.   Heart: Regular rate and rhythm.  S1 and S2 normal.  No murmurs appreciated.  Respiratory: Normal respiratory effort.  Clear to auscultation bilaterally.  Abdomen: Non-distended, soft  Extremities: No leg edema.    Assessment/Plan:     Diagnoses and all orders for this visit:    Essential hypertension  Chronic, stable, amlodipine, losartan-hydrochlorothiazide.  Follow-up in a year  -     CBC WITHOUT DIFFERENTIAL; Future  -     Comp Metabolic Panel; Future  -     losartan-hydrochlorothiazide (HYZAAR) 100-12.5 MG per tablet; Take 1 Tablet by mouth every day.  -     amLODIPine (NORVASC) 10 MG Tab; Take 1 Tablet by mouth every day.    Dyslipidemia  Chronic, stable, continue to monitor.  He has been working on healthy diet, exercising more regularly.  -     Comp Metabolic Panel; Future  -     Lipid Profile; Future    Need for hepatitis C screening test  -     HEP C VIRUS ANTIBODY; Future          Return in about 1 year (around 5/16/2024) for Med check.      "

## 2023-09-25 ENCOUNTER — DOCUMENTATION (OUTPATIENT)
Dept: HEALTH INFORMATION MANAGEMENT | Facility: OTHER | Age: 38
End: 2023-09-25
Payer: COMMERCIAL

## 2023-11-30 ENCOUNTER — OFFICE VISIT (OUTPATIENT)
Dept: MEDICAL GROUP | Facility: MEDICAL CENTER | Age: 38
End: 2023-11-30
Payer: COMMERCIAL

## 2023-11-30 ENCOUNTER — HOSPITAL ENCOUNTER (OUTPATIENT)
Dept: LAB | Facility: MEDICAL CENTER | Age: 38
End: 2023-11-30
Payer: COMMERCIAL

## 2023-11-30 VITALS
BODY MASS INDEX: 29.79 KG/M2 | DIASTOLIC BLOOD PRESSURE: 64 MMHG | TEMPERATURE: 97.1 F | HEART RATE: 104 BPM | HEIGHT: 76 IN | WEIGHT: 244.6 LBS | SYSTOLIC BLOOD PRESSURE: 118 MMHG | OXYGEN SATURATION: 98 %

## 2023-11-30 DIAGNOSIS — I10 ESSENTIAL HYPERTENSION: ICD-10-CM

## 2023-11-30 DIAGNOSIS — Z11.59 NEED FOR HEPATITIS C SCREENING TEST: ICD-10-CM

## 2023-11-30 DIAGNOSIS — N41.1 CHRONIC PROSTATITIS: ICD-10-CM

## 2023-11-30 DIAGNOSIS — Z23 NEED FOR VACCINATION: ICD-10-CM

## 2023-11-30 DIAGNOSIS — Z11.4 ENCOUNTER FOR SCREENING FOR HIV: ICD-10-CM

## 2023-11-30 DIAGNOSIS — K21.9 GASTROESOPHAGEAL REFLUX DISEASE WITHOUT ESOPHAGITIS: ICD-10-CM

## 2023-11-30 DIAGNOSIS — M72.2 PLANTAR FASCIITIS: ICD-10-CM

## 2023-11-30 DIAGNOSIS — E78.5 DYSLIPIDEMIA: ICD-10-CM

## 2023-11-30 PROBLEM — N41.9 PROSTATITIS: Status: ACTIVE | Noted: 2022-11-13

## 2023-11-30 LAB
APPEARANCE UR: CLEAR
BILIRUB UR STRIP-MCNC: NORMAL MG/DL
COLOR UR AUTO: YELLOW
ERYTHROCYTE [DISTWIDTH] IN BLOOD BY AUTOMATED COUNT: 42.1 FL (ref 35.9–50)
GLUCOSE UR STRIP.AUTO-MCNC: NORMAL MG/DL
HCT VFR BLD AUTO: 46 % (ref 42–52)
HGB BLD-MCNC: 15.6 G/DL (ref 14–18)
KETONES UR STRIP.AUTO-MCNC: NORMAL MG/DL
LEUKOCYTE ESTERASE UR QL STRIP.AUTO: NORMAL
MCH RBC QN AUTO: 31.2 PG (ref 27–33)
MCHC RBC AUTO-ENTMCNC: 33.9 G/DL (ref 32.3–36.5)
MCV RBC AUTO: 92 FL (ref 81.4–97.8)
NITRITE UR QL STRIP.AUTO: NORMAL
PH UR STRIP.AUTO: 6 [PH] (ref 5–8)
PLATELET # BLD AUTO: 226 K/UL (ref 164–446)
PMV BLD AUTO: 10.9 FL (ref 9–12.9)
PROT UR QL STRIP: NORMAL MG/DL
RBC # BLD AUTO: 5 M/UL (ref 4.7–6.1)
RBC UR QL AUTO: NORMAL
SP GR UR STRIP.AUTO: 1.02
UROBILINOGEN UR STRIP-MCNC: 0.2 MG/DL
WBC # BLD AUTO: 4.3 K/UL (ref 4.8–10.8)

## 2023-11-30 PROCEDURE — 81002 URINALYSIS NONAUTO W/O SCOPE: CPT

## 2023-11-30 PROCEDURE — 85027 COMPLETE CBC AUTOMATED: CPT

## 2023-11-30 PROCEDURE — 3074F SYST BP LT 130 MM HG: CPT

## 2023-11-30 PROCEDURE — 36415 COLL VENOUS BLD VENIPUNCTURE: CPT

## 2023-11-30 PROCEDURE — 84443 ASSAY THYROID STIM HORMONE: CPT

## 2023-11-30 PROCEDURE — 90471 IMMUNIZATION ADMIN: CPT

## 2023-11-30 PROCEDURE — 3078F DIAST BP <80 MM HG: CPT

## 2023-11-30 PROCEDURE — 80053 COMPREHEN METABOLIC PANEL: CPT

## 2023-11-30 PROCEDURE — 99214 OFFICE O/P EST MOD 30 MIN: CPT | Mod: 25

## 2023-11-30 PROCEDURE — 90686 IIV4 VACC NO PRSV 0.5 ML IM: CPT

## 2023-11-30 PROCEDURE — 87389 HIV-1 AG W/HIV-1&-2 AB AG IA: CPT

## 2023-11-30 PROCEDURE — 86803 HEPATITIS C AB TEST: CPT

## 2023-11-30 PROCEDURE — 80061 LIPID PANEL: CPT

## 2023-11-30 RX ORDER — LOSARTAN POTASSIUM AND HYDROCHLOROTHIAZIDE 25; 100 MG/1; MG/1
TABLET ORAL
COMMUNITY
End: 2023-11-30

## 2023-11-30 RX ORDER — LEVOFLOXACIN 500 MG/1
500 TABLET, FILM COATED ORAL DAILY
Refills: 0 | Status: CANCELLED | OUTPATIENT
Start: 2023-11-30

## 2023-11-30 RX ORDER — CIPROFLOXACIN 500 MG/1
500 TABLET, FILM COATED ORAL 2 TIMES DAILY
Qty: 42 TABLET | Refills: 0 | Status: SHIPPED | OUTPATIENT
Start: 2023-11-30 | End: 2023-12-21

## 2023-11-30 RX ORDER — FAMOTIDINE 20 MG/1
20 TABLET, FILM COATED ORAL 2 TIMES DAILY
Qty: 60 TABLET | Refills: 1 | Status: CANCELLED | OUTPATIENT
Start: 2023-11-30

## 2023-11-30 ASSESSMENT — ENCOUNTER SYMPTOMS
PALPITATIONS: 0
ORTHOPNEA: 0
SHORTNESS OF BREATH: 0
COUGH: 0
FEVER: 0
CHILLS: 0

## 2023-11-30 ASSESSMENT — FIBROSIS 4 INDEX: FIB4 SCORE: 0.89

## 2023-11-30 NOTE — PROGRESS NOTES
Subjective:     CC: Establish Care (Pt states about a month ago he started having really bad heartburn and nothing was helping. Pt states now it comes and goes at random times and is extremely gassy. Pt states that when he walks that both of his feet feel like there is a shock going through his feet. Pt states he has been having very painful urination and a lot of frequency on and off for a couple years.)      HPI:   Roge is a 38 y.o. male who presents today for:    Hypertension: Patient currently taking amlodipine, and Hyzaar.  Blood pressure well-controlled today.  Patient denies any side effects from his medication.    Dyslipidemia: Patient's cholesterol slightly elevated.  He has been trying to work on diet and exercise to help manage this without medication.    GERD: States for the last few months he has been having acid reflux associated with any types of food. Tums and Pepcid is helpful. Normally he only gets it with chips/ salsa. This lasted for about 2 1/2 weeks. He feels gassy and bloated as well. He did a juice cleanse for a week and this helped. Symptoms have improved since the cleanse. He was having yellow lose stool prior to the juice cleanse. His stool has returned to normal. He has been using probiotics as well.     Chronic prostitis: He did see urology October 4, 22 and was treated with 2 weeks of Levaquin.  He states that he did not have improvement after completing this. His symptoms come and go. It returned about a month ago. He has been having painful urination. He does endorse pain while sitting. He has had urinary frequency. He feels like he is not emptying his bladder completely. Urine is clear in color.     Foot pain: He states he has pain while walking in front of his right heel. Symptoms are intermittent. He describes the pain as an electric shock. Pain resolves while he is running. This has been going on for about 1 month.        Allergies: Lisinopril, Sulfa drugs, and Sulfa, Benzamide  "    Medications:   Current Outpatient Medications:     ciprofloxacin (CIPRO) 500 MG Tab, Take 1 Tablet by mouth 2 times a day for 21 days., Disp: 42 Tablet, Rfl: 0    losartan-hydrochlorothiazide (HYZAAR) 100-12.5 MG per tablet, Take 1 Tablet by mouth every day., Disp: 90 Tablet, Rfl: 3    amLODIPine (NORVASC) 10 MG Tab, Take 1 Tablet by mouth every day., Disp: 90 Tablet, Rfl: 3      ROS:  Review of Systems   Constitutional:  Negative for chills and fever.   Respiratory:  Negative for cough and shortness of breath.    Cardiovascular:  Negative for chest pain, palpitations, orthopnea and leg swelling.       Objective:     Exam:  /64   Pulse (!) 104   Temp 36.2 °C (97.1 °F) (Temporal)   Ht 1.93 m (6' 4\")   Wt 111 kg (244 lb 9.6 oz)   SpO2 98%   BMI 29.77 kg/m²  Body mass index is 29.77 kg/m².    Physical Exam  Constitutional:       Appearance: He is normal weight.   Eyes:      Pupils: Pupils are equal, round, and reactive to light.   Cardiovascular:      Rate and Rhythm: Normal rate and regular rhythm.      Pulses: Normal pulses.      Heart sounds: Normal heart sounds.   Pulmonary:      Effort: Pulmonary effort is normal.      Breath sounds: Normal breath sounds.   Neurological:      Mental Status: He is alert and oriented to person, place, and time.   Psychiatric:         Mood and Affect: Mood normal.         Behavior: Behavior normal.           Assessment & Plan:     Roge a 38 y.o. male with the following -     1. Essential hypertension  Chronic, stable  Encouraged patient to continue to work on diet and exercise to help with blood pressure management.  Continue current dose of medications.  - TSH WITH REFLEX TO FT4; Future  - Comp Metabolic Panel; Future  - CBC WITHOUT DIFFERENTIAL; Future  - continue losartan-hydrochlorothiazide (HYZAAR) 100-12.5 MG per tablet, Take 1 Tablet by mouth every day., Disp: 90 Tablet, Rfl: 3  - continue amLODIPine (NORVASC) 10 MG Tab, Take 1 Tablet by mouth every day., " Disp: 90 Tablet, Rfl: 3    2. Dyslipidemia  Chronic, stable  Encouraged patient to continue to work on diet and exercise to help with cholesterol management.  - Lipid Profile; Future    3. Gastroesophageal reflux disease without esophagitis  Acute, uncomplicated  We discussed avoiding triggering foods.  Using Pepcid and Tums as needed.  Will check stool test for H. pylori.  - H.PYLORI STOOL ANTIGEN; Future    4. Chronic prostatitis  Chronic, unstable  He has seen urology last year.  Symptoms of started to flareup again.  Will check UA today.  - POCT Urinalysis  - ciprofloxacin (CIPRO) 500 MG Tab; Take 1 Tablet by mouth 2 times a day for 21 days.  Dispense: 42 Tablet; Refill: 0    5. Plantar fasciitis  Acute, uncomplicated  We discussed ice, heat, stretching, foot brace, and massage to help with pain. I encouraged him to make sure that he has shoes that support his feet, and recommended that he limit running for the next few weeks to allow his foot to heal.  Patient declined referral to podiatry at this time, but will notify the office if his symptoms worsen.    6. Need for vaccination  - INFLUENZA VACCINE QUAD INJ (PF)    7. Need for hepatitis C screening test  - HEP C VIRUS ANTIBODY; Future    8. Encounter for screening for HIV  - HIV AG/AB COMBO ASSAY SCREENING; Future        Anticipatory guidance included the following: Patient counseled about skin care, diet, supplements, smoking, drugs/alcohol use, safe sex and exercise.     Return in about 1 year (around 11/30/2024), or if symptoms worsen or fail to improve.    Please note that this dictation was created using voice recognition software. I have made every reasonable attempt to correct obvious errors, but I expect that there are errors of grammar and possibly content that I did not discover before finalizing the note.

## 2023-12-01 ENCOUNTER — HOSPITAL ENCOUNTER (OUTPATIENT)
Facility: MEDICAL CENTER | Age: 38
End: 2023-12-01
Payer: COMMERCIAL

## 2023-12-01 LAB
ALBUMIN SERPL BCP-MCNC: 4.5 G/DL (ref 3.2–4.9)
ALBUMIN/GLOB SERPL: 1.6 G/DL
ALP SERPL-CCNC: 65 U/L (ref 30–99)
ALT SERPL-CCNC: 53 U/L (ref 2–50)
ANION GAP SERPL CALC-SCNC: 11 MMOL/L (ref 7–16)
AST SERPL-CCNC: 42 U/L (ref 12–45)
BILIRUB SERPL-MCNC: 0.5 MG/DL (ref 0.1–1.5)
BUN SERPL-MCNC: 16 MG/DL (ref 8–22)
CALCIUM ALBUM COR SERPL-MCNC: 8.8 MG/DL (ref 8.5–10.5)
CALCIUM SERPL-MCNC: 9.2 MG/DL (ref 8.5–10.5)
CHLORIDE SERPL-SCNC: 105 MMOL/L (ref 96–112)
CHOLEST SERPL-MCNC: 191 MG/DL (ref 100–199)
CO2 SERPL-SCNC: 25 MMOL/L (ref 20–33)
CREAT SERPL-MCNC: 1.09 MG/DL (ref 0.5–1.4)
GFR SERPLBLD CREATININE-BSD FMLA CKD-EPI: 89 ML/MIN/1.73 M 2
GLOBULIN SER CALC-MCNC: 2.8 G/DL (ref 1.9–3.5)
GLUCOSE SERPL-MCNC: 95 MG/DL (ref 65–99)
H PYLORI AG STL QL IA: NOT DETECTED
HCV AB SER QL: NORMAL
HDLC SERPL-MCNC: 38 MG/DL
HIV 1+2 AB+HIV1 P24 AG SERPL QL IA: NORMAL
LDLC SERPL CALC-MCNC: 119 MG/DL
POTASSIUM SERPL-SCNC: 4.2 MMOL/L (ref 3.6–5.5)
PROT SERPL-MCNC: 7.3 G/DL (ref 6–8.2)
SODIUM SERPL-SCNC: 141 MMOL/L (ref 135–145)
TRIGL SERPL-MCNC: 168 MG/DL (ref 0–149)
TSH SERPL DL<=0.005 MIU/L-ACNC: 1.6 UIU/ML (ref 0.38–5.33)

## 2023-12-01 PROCEDURE — 87338 HPYLORI STOOL AG IA: CPT

## 2024-05-23 ENCOUNTER — OFFICE VISIT (OUTPATIENT)
Dept: MEDICAL GROUP | Facility: MEDICAL CENTER | Age: 39
End: 2024-05-23
Payer: COMMERCIAL

## 2024-05-23 VITALS
HEIGHT: 76 IN | OXYGEN SATURATION: 100 % | BODY MASS INDEX: 30.85 KG/M2 | DIASTOLIC BLOOD PRESSURE: 94 MMHG | SYSTOLIC BLOOD PRESSURE: 124 MMHG | WEIGHT: 253.31 LBS | HEART RATE: 75 BPM | TEMPERATURE: 97.9 F

## 2024-05-23 DIAGNOSIS — F51.01 PRIMARY INSOMNIA: ICD-10-CM

## 2024-05-23 DIAGNOSIS — I10 ESSENTIAL HYPERTENSION: ICD-10-CM

## 2024-05-23 DIAGNOSIS — Z86.59 HISTORY OF SLEEP WALKING: ICD-10-CM

## 2024-05-23 PROCEDURE — 3080F DIAST BP >= 90 MM HG: CPT | Performed by: STUDENT IN AN ORGANIZED HEALTH CARE EDUCATION/TRAINING PROGRAM

## 2024-05-23 PROCEDURE — 99214 OFFICE O/P EST MOD 30 MIN: CPT | Performed by: STUDENT IN AN ORGANIZED HEALTH CARE EDUCATION/TRAINING PROGRAM

## 2024-05-23 PROCEDURE — 3074F SYST BP LT 130 MM HG: CPT | Performed by: STUDENT IN AN ORGANIZED HEALTH CARE EDUCATION/TRAINING PROGRAM

## 2024-05-23 RX ORDER — LOSARTAN POTASSIUM AND HYDROCHLOROTHIAZIDE 12.5; 1 MG/1; MG/1
1 TABLET ORAL DAILY
Qty: 90 TABLET | Refills: 3 | Status: SHIPPED | OUTPATIENT
Start: 2024-05-23

## 2024-05-23 RX ORDER — AMLODIPINE BESYLATE 10 MG/1
10 TABLET ORAL DAILY
Qty: 90 TABLET | Refills: 3 | Status: SHIPPED | OUTPATIENT
Start: 2024-05-23

## 2024-05-23 ASSESSMENT — ENCOUNTER SYMPTOMS
SHORTNESS OF BREATH: 0
FEVER: 0
PALPITATIONS: 0
DIZZINESS: 0
WHEEZING: 0
NAUSEA: 0
CHILLS: 0
VOMITING: 0
WEIGHT LOSS: 0
HEADACHES: 0

## 2024-05-23 ASSESSMENT — PATIENT HEALTH QUESTIONNAIRE - PHQ9: CLINICAL INTERPRETATION OF PHQ2 SCORE: 0

## 2024-05-23 ASSESSMENT — FIBROSIS 4 INDEX: FIB4 SCORE: 0.97

## 2024-05-23 NOTE — PROGRESS NOTES
"Subjective:     CC:     HPI:   Roge presents today with    Pmh HTN, HLD, hx of prostatitis,   Last lab 11/30/2023 noted for mild alt elevation 0, renal fxn fine, cbc was fine, dyslipidemia, thyroid fine, h pylori not detected.       Problem   History of Sleep Walking   Primary Insomnia    #Insomnia  #Day time sleepiness  #Night time awakening   This is chronic condition x 6 month, waking up at 3 15 am.   He denies increase in stress, he has tried over the counter medication, exercises regularly.   Rare caffeine  What happens when you try to sleep? He can fall asleep without issue.   Is something waking you up? He reports nothing is waking him up, no noise  Sleeps 4-6 hours     Things tried:  Sleep hygiene: report has tried treadmill     Medications:  OTC meds  History of sleep walking          Health Maintenance:     ROS:  Review of Systems   Constitutional:  Negative for chills, fever and weight loss.   HENT:  Negative for hearing loss.    Respiratory:  Negative for shortness of breath and wheezing.    Cardiovascular:  Negative for chest pain and palpitations.   Gastrointestinal:  Negative for nausea and vomiting.   Genitourinary:  Negative for frequency and urgency.   Skin:  Negative for rash.   Neurological:  Negative for dizziness and headaches.       Objective:     Exam:  BP (!) 124/94 (BP Location: Left arm)   Pulse 75   Temp 36.6 °C (97.9 °F)   Ht 1.93 m (6' 4\")   Wt 115 kg (253 lb 4.9 oz)   SpO2 100%   BMI 30.83 kg/m²  Body mass index is 30.83 kg/m².    Physical Exam  Constitutional:       Appearance: Normal appearance.   Cardiovascular:      Rate and Rhythm: Normal rate and regular rhythm.   Pulmonary:      Effort: Pulmonary effort is normal.      Breath sounds: Normal breath sounds.   Musculoskeletal:      Cervical back: Normal range of motion and neck supple.   Lymphadenopathy:      Cervical: No cervical adenopathy.   Neurological:      Mental Status: He is alert.             Labs:     Assessment " & Plan:     38 y.o. male with the following -     1. Essential hypertension  Chronic stable  Lab reviewed as per hpi  Joesphies cp, rae, sob  Tolerating amlodipine, losartan- hctz 100-12.5 without reported a/e    - amLODIPine (NORVASC) 10 MG Tab; Take 1 Tablet by mouth every day.  Dispense: 90 Tablet; Refill: 3  - losartan-hydrochlorothiazide (HYZAAR) 100-12.5 MG per tablet; Take 1 Tablet by mouth every day.  Dispense: 90 Tablet; Refill: 3      2. Primary insomnia  3. History of sleep walking  Chronic stable   Etiology unclear, like multifactorial with history of complex sleep behavior, snoring. I think it is reasonable to screen for sleep apnea given he is overweight on bp meds and report significant snoring.   - Overnight Home Sleep Study; Future              Return in about 3 months (around 8/23/2024) for Lab review/ sleep.    Please note that this dictation was created using voice recognition software. I have made every reasonable attempt to correct obvious errors, but I expect that there are errors of grammar and possibly content that I did not discover before finalizing the note.

## 2024-05-30 ENCOUNTER — TELEPHONE (OUTPATIENT)
Dept: HEALTH INFORMATION MANAGEMENT | Facility: OTHER | Age: 39
End: 2024-05-30
Payer: COMMERCIAL

## 2025-02-28 ENCOUNTER — APPOINTMENT (OUTPATIENT)
Dept: SLEEP MEDICINE | Facility: MEDICAL CENTER | Age: 40
End: 2025-02-28
Attending: STUDENT IN AN ORGANIZED HEALTH CARE EDUCATION/TRAINING PROGRAM
Payer: COMMERCIAL

## 2025-02-28 DIAGNOSIS — F51.01 PRIMARY INSOMNIA: ICD-10-CM

## 2025-02-28 PROCEDURE — 95800 SLP STDY UNATTENDED: CPT | Performed by: STUDENT IN AN ORGANIZED HEALTH CARE EDUCATION/TRAINING PROGRAM

## 2025-03-05 ENCOUNTER — RESULTS FOLLOW-UP (OUTPATIENT)
Dept: MEDICAL GROUP | Facility: MEDICAL CENTER | Age: 40
End: 2025-03-05

## 2025-03-05 NOTE — PROCEDURES
DIAGNOSTIC HOME SLEEP TEST (HST) REPORT WatchPAT      PATIENT ID:  NAME:  Roge Meade  MRN:               [id number]  YOB: 1985  DATE OF STUDY: 02/28/2025      Impression:     This study shows evidence of:      1. Moderate obstructive sleep apnea with PAT apnea hypopnea index(pAHI) of 22 per hour.  PAT respiratory disturbance index (pRDI) was 27.7 per hour. These findings are based on 7 channels recording of PAT signal with sleep staging, heart rate, pulse oximetry, actigraphy, body position, snoring and respiratory movement.     2. Oxygenation O2 Sat. mean O2 sat was 92%,  jimbo was 82%,  and maximum O2 at 97 %. O2 sat was at or  below 88% for 3 min of evaluation time. Oxygen Desaturation (>=4%) Index was 11.1/hr. AVG HR was 65 BPM.      TECHNICAL DESCRIPTION: Patient underwent home sleep apnea testing with peripheral arterial tone signal (WatchPAT™). This is a Type IV portable monitor and device per Medicare. Monitoring was done with 7 channels recording of PAT signal with sleep staging, heart rate, pulse oximetry, actigraphy, body position, snoring and respiratory movement. Prior to using the device, the patient received verbal and written instructions for its application and was provided with the help desk phone number for additional telephonic instruction with 24-hour availability of qualified personnel to answer questions.    Respiratory events:        General sleep summary: . Total recording time is 6 hours and 39 minutes and total Sleep time is 6 hours and 15 minutes. The patient spent 165 minutes in the supine position and 211 minutes in the nonsupine position.      Recommendations:    1. CPAP titration study vs Auto CPAP trial.  If starting auto CPAP recommend minimum pressure of 4 cmH2O maximum pressure 12 cmH2O.    2. In general patients with sleep apnea are advised to avoid alcohol and sedatives and to not operate a motor vehicle while drowsy. In some cases alternative  treatment options may prove effective in resolving sleep apnea in these options include upper airway surgery, the use of a dental orthotic or weight loss and positional therapy. Clinical correlation is required.

## 2025-04-30 ENCOUNTER — PATIENT MESSAGE (OUTPATIENT)
Dept: MEDICAL GROUP | Facility: MEDICAL CENTER | Age: 40
End: 2025-04-30

## 2025-04-30 ENCOUNTER — HOSPITAL ENCOUNTER (OUTPATIENT)
Dept: LAB | Facility: MEDICAL CENTER | Age: 40
End: 2025-04-30
Payer: COMMERCIAL

## 2025-04-30 ENCOUNTER — OFFICE VISIT (OUTPATIENT)
Dept: MEDICAL GROUP | Facility: MEDICAL CENTER | Age: 40
End: 2025-04-30
Payer: COMMERCIAL

## 2025-04-30 VITALS
WEIGHT: 251.2 LBS | DIASTOLIC BLOOD PRESSURE: 80 MMHG | RESPIRATION RATE: 17 BRPM | HEART RATE: 84 BPM | HEIGHT: 76 IN | SYSTOLIC BLOOD PRESSURE: 122 MMHG | TEMPERATURE: 97.1 F | BODY MASS INDEX: 30.59 KG/M2 | OXYGEN SATURATION: 97 %

## 2025-04-30 DIAGNOSIS — R39.16 URINARY STRAINING: ICD-10-CM

## 2025-04-30 DIAGNOSIS — E66.9 OBESITY (BMI 30-39.9): ICD-10-CM

## 2025-04-30 DIAGNOSIS — Z13.228 SCREENING FOR ENDOCRINE, METABOLIC AND IMMUNITY DISORDER: ICD-10-CM

## 2025-04-30 DIAGNOSIS — R19.5 LOOSE STOOLS: ICD-10-CM

## 2025-04-30 DIAGNOSIS — G47.33 OSA (OBSTRUCTIVE SLEEP APNEA): ICD-10-CM

## 2025-04-30 DIAGNOSIS — I10 ESSENTIAL HYPERTENSION: ICD-10-CM

## 2025-04-30 DIAGNOSIS — R35.0 URINARY FREQUENCY: ICD-10-CM

## 2025-04-30 DIAGNOSIS — Z13.0 SCREENING FOR ENDOCRINE, METABOLIC AND IMMUNITY DISORDER: ICD-10-CM

## 2025-04-30 DIAGNOSIS — R19.4 BOWEL HABIT CHANGES: ICD-10-CM

## 2025-04-30 DIAGNOSIS — Z13.29 SCREENING FOR ENDOCRINE, METABOLIC AND IMMUNITY DISORDER: ICD-10-CM

## 2025-04-30 DIAGNOSIS — N41.1 CHRONIC PROSTATITIS: ICD-10-CM

## 2025-04-30 DIAGNOSIS — J35.8 TONSILLITH: ICD-10-CM

## 2025-04-30 LAB
ALBUMIN SERPL BCP-MCNC: 4.5 G/DL (ref 3.2–4.9)
ALBUMIN/GLOB SERPL: 1.3 G/DL
ALP SERPL-CCNC: 77 U/L (ref 30–99)
ALT SERPL-CCNC: 47 U/L (ref 2–50)
ANION GAP SERPL CALC-SCNC: 10 MMOL/L (ref 7–16)
AST SERPL-CCNC: 27 U/L (ref 12–45)
BILIRUB SERPL-MCNC: 0.7 MG/DL (ref 0.1–1.5)
BUN SERPL-MCNC: 16 MG/DL (ref 8–22)
CALCIUM ALBUM COR SERPL-MCNC: 9.4 MG/DL (ref 8.5–10.5)
CALCIUM SERPL-MCNC: 9.8 MG/DL (ref 8.5–10.5)
CHLORIDE SERPL-SCNC: 103 MMOL/L (ref 96–112)
CHOLEST SERPL-MCNC: 171 MG/DL (ref 100–199)
CO2 SERPL-SCNC: 27 MMOL/L (ref 20–33)
CREAT SERPL-MCNC: 1.14 MG/DL (ref 0.5–1.4)
ERYTHROCYTE [DISTWIDTH] IN BLOOD BY AUTOMATED COUNT: 41.3 FL (ref 35.9–50)
EST. AVERAGE GLUCOSE BLD GHB EST-MCNC: 114 MG/DL
GFR SERPLBLD CREATININE-BSD FMLA CKD-EPI: 83 ML/MIN/1.73 M 2
GLOBULIN SER CALC-MCNC: 3.4 G/DL (ref 1.9–3.5)
GLUCOSE SERPL-MCNC: 106 MG/DL (ref 65–99)
HBA1C MFR BLD: 5.6 % (ref 4–5.6)
HCT VFR BLD AUTO: 49.1 % (ref 42–52)
HDLC SERPL-MCNC: 32 MG/DL
HGB BLD-MCNC: 16.8 G/DL (ref 14–18)
LDLC SERPL CALC-MCNC: 104 MG/DL
MCH RBC QN AUTO: 31.3 PG (ref 27–33)
MCHC RBC AUTO-ENTMCNC: 34.2 G/DL (ref 32.3–36.5)
MCV RBC AUTO: 91.6 FL (ref 81.4–97.8)
PLATELET # BLD AUTO: 241 K/UL (ref 164–446)
PMV BLD AUTO: 11 FL (ref 9–12.9)
POTASSIUM SERPL-SCNC: 4.1 MMOL/L (ref 3.6–5.5)
PROT SERPL-MCNC: 7.9 G/DL (ref 6–8.2)
PSA SERPL DL<=0.01 NG/ML-MCNC: 0.57 NG/ML (ref 0–4)
RBC # BLD AUTO: 5.36 M/UL (ref 4.7–6.1)
SODIUM SERPL-SCNC: 140 MMOL/L (ref 135–145)
TRIGL SERPL-MCNC: 173 MG/DL (ref 0–149)
TSH SERPL DL<=0.005 MIU/L-ACNC: 1.06 UIU/ML (ref 0.38–5.33)
WBC # BLD AUTO: 6.3 K/UL (ref 4.8–10.8)

## 2025-04-30 PROCEDURE — 36415 COLL VENOUS BLD VENIPUNCTURE: CPT

## 2025-04-30 PROCEDURE — 80061 LIPID PANEL: CPT

## 2025-04-30 PROCEDURE — 84443 ASSAY THYROID STIM HORMONE: CPT

## 2025-04-30 PROCEDURE — 80053 COMPREHEN METABOLIC PANEL: CPT

## 2025-04-30 PROCEDURE — 84153 ASSAY OF PSA TOTAL: CPT

## 2025-04-30 PROCEDURE — 85027 COMPLETE CBC AUTOMATED: CPT

## 2025-04-30 PROCEDURE — 83036 HEMOGLOBIN GLYCOSYLATED A1C: CPT

## 2025-04-30 ASSESSMENT — FIBROSIS 4 INDEX: FIB4 SCORE: 1

## 2025-04-30 ASSESSMENT — ENCOUNTER SYMPTOMS
PALPITATIONS: 0
SHORTNESS OF BREATH: 0
FEVER: 0
ORTHOPNEA: 0
CHILLS: 0
COUGH: 0

## 2025-04-30 ASSESSMENT — PATIENT HEALTH QUESTIONNAIRE - PHQ9: CLINICAL INTERPRETATION OF PHQ2 SCORE: 0

## 2025-04-30 NOTE — PROGRESS NOTES
Subjective:     Verbal consent was acquired by the patient to use [employer] ambient listening note generation during this visit Yes    CC: Follow-Up (Sleep Apnea, BP, Night sweats, Urinary Frequency. No other signs of a UTI)      HPI:   Roge is a 39 y.o. male who presents today for:    History of Present Illness  The patient presents for follow-up regarding a sleep study, urinary frequency, nocturnal hyperhidrosis, and hypertension.    Sleep apnea/ obesity  The patient reports sleep disturbances attributed to an aversion to facial contact during sleep, which he associates with a psychological issue. His wife has noted a slight improvement in his condition following dietary modifications and the initiation of an exercise regimen. Over the past 30 days, the patient has achieved a weight loss of 10 pounds. He has abstained from alcohol consumption since December 2024, substituting soda initially, which he has not consumed for the past 45 days. His diet excludes cookies, cakes, snacks, treats, and sugars, focusing instead on high fiber and lean protein intake, with a daily caloric intake of 1800 calories. He engages in physical activity 3 to 4 times per week.     Bowel Irregularities  The patient reports a lack of solid bowel movements for approximately 60 days, which began 2 weeks prior to dietary changes. Despite attempts to increase fiber intake through diet and supplements, bowel consistency remains unchanged. No hematochezia is noted, but the stool is described as yellowish and liquid in consistency. Increased flatulence is attributed to the fiber-rich diet. He reports no recent travel, potential exposure to contaminated water, nausea, or vomiting. Fluid intake primarily consists of water, with occasional coconut water. No antibiotics have been taken within the past 4 months. He is due for a colonoscopy next year. He has undergone two colonoscopies in the past 5 years, with the first revealing 2 small polyps  and the second showing no abnormalities.  - Onset: Approximately 60 days ago, 2 weeks prior to dietary changes.  - Duration: Persistent for 60 days.  - Character: Yellowish, liquid stool; increased flatulence.  - Alleviating/Aggravating Factors: Increased fiber intake through diet and supplements.  - Severity: No solid bowel movements; no hematochezia.    Urinary Frequency  The patient experienced a year-long episode of dysuria approximately 4 to 5 years ago, attributed to prostatitis. Cystoscopy and colonoscopy were normal during this period. Various antibiotics were prescribed. He reports that occasional consumption of black coffee increases urinary frequency and alters the odor of his urine. He notes reduced urine flow even when not consuming coffee. Over the past 60 days, he has noticed a medicinal odor in his urine, similar to the smell when taking Mucinex, although he has not taken this medication. He reports no erectile dysfunction or cloudy urine post-ejaculation. Increased water intake is noted since dietary changes. Urinary frequency is bothersome, particularly when it disrupts sleep. His last consultation with a urologist was several years ago.  - Onset: Year-long episode of dysuria 4 to 5 years ago; increased urinary frequency over the past 60 days.  - Duration: Persistent for 60 days.  - Character: Increased urinary frequency; medicinal odor in urine.  - Alleviating/Aggravating Factors: Occasional consumption of black coffee increases frequency and alters odor; increased water intake.  - Severity: Bothersome, particularly when disrupting sleep.    Hypertension  Home blood pressure monitoring using a wrist cuff typically reads around 140/95. However, blood pressure was recorded as 122/80 during today's visit. He has been compliant with his Amlodipine 10 mg daily and Hyzaar 100-12.5 mg daily.   - Character: Blood pressure typically around 140/95.  - Severity: Blood pressure recorded as 122/80 during  "today's visit.    Tonsilloliths  The patient reports the appearance of tonsilloliths approximately 6 months ago, which he had not previously encountered. No tonsillar swelling is reported, but irritation is noted after manipulation.  - Onset: Approximately 6 months ago.  - Character: Appearance of tonsilloliths; irritation after manipulation.    PAST SURGICAL HISTORY:  The patient has undergone two colonoscopies in the past 5 years, with the first revealing 2 small polyps and the second showing no abnormalities.    SOCIAL HISTORY  He has had no alcohol since .    FAMILY HISTORY  His father  of bladder cancer.         Allergies: Lisinopril, Sulfa drugs, and Sulfabenzamide     Medications:   Current Outpatient Medications:     amLODIPine (NORVASC) 10 MG Tab, Take 1 Tablet by mouth every day., Disp: 90 Tablet, Rfl: 3    losartan-hydrochlorothiazide (HYZAAR) 100-12.5 MG per tablet, Take 1 Tablet by mouth every day., Disp: 90 Tablet, Rfl: 3      ROS:  Review of Systems   Constitutional:  Negative for chills and fever.   Respiratory:  Negative for cough and shortness of breath.    Cardiovascular:  Negative for chest pain, palpitations, orthopnea and leg swelling.       Objective:     Exam:  /80   Pulse 84   Temp 36.2 °C (97.1 °F) (Temporal)   Resp 17   Ht 1.93 m (6' 4\")   Wt 114 kg (251 lb 3.2 oz)   SpO2 97%   BMI 30.58 kg/m²  Body mass index is 30.58 kg/m².    Physical Exam  Constitutional:       Appearance: He is normal weight.   Eyes:      Pupils: Pupils are equal, round, and reactive to light.   Cardiovascular:      Rate and Rhythm: Normal rate and regular rhythm.      Pulses: Normal pulses.      Heart sounds: Normal heart sounds.   Pulmonary:      Effort: Pulmonary effort is normal.      Breath sounds: Normal breath sounds.   Neurological:      Mental Status: He is alert and oriented to person, place, and time.   Psychiatric:         Mood and Affect: Mood normal.         Behavior: Behavior " normal.           Assessment & Plan:     Roge a 39 y.o. male with the following -     Assessment & Plan     1. TREV (obstructive sleep apnea)  Chronic, stable  The patient's sleep study results indicate the presence of sleep apnea. He has expressed that he will likely experience discomfort with the use of CPAP due to facial contact during sleep. He does not like things on his face while sleeping.   Diagnostic plan: A referral to a sleep medicine specialist will be initiated for further evaluation and potential prescription of an oral appliance designed to advance the tongue and jaw slightly forward.  Treatment plan: He is advised to continue with his dietary modifications and weight loss efforts, as these can significantly alleviate symptoms of sleep apnea.  Clinical decision making: The patient has lost 10 pounds in the last 30 days and has maintained a strict diet and exercise regimen.  - Referral to Pulmonary and Sleep Medicine    2. Obesity (BMI 30-39.9)  Chronic, stable- improving  He has lost 10 lbs in the last month. Continue to work on diet and exercise.   - Patient identified as having weight management issue.  Appropriate orders and counseling given.  - TSH WITH REFLEX TO FT4; Future  - Comp Metabolic Panel; Future  - Lipid Profile; Future  - CBC WITHOUT DIFFERENTIAL; Future  - HEMOGLOBIN A1C; Future    3. Loose stools  4. Bowel habit changes  The patient reports changes in bowel habits over the past 60 days, including loose stools and increased gas. Despite dietary adjustments and fiber supplementation, there has been no significant improvement.  Diagnostic plan: A referral to a gastroenterologist will be made for further evaluation. The patient is due for a colonoscopy next year, which will be discussed with the gastroenterologist.  Treatment plan: The patient's diet includes high fiber, lean proteins, and no alcohol or soda for the past 45 days.  Clinical decision making: There is no evidence of blood  in the stool, abdominal pain, or distention. No recent travel or antibiotic use was reported.  - Referral to Gastroenterology    5. Urinary frequency  6. Urinary straining  7. Chronic prostatitis  Acute on chronic  The patient reports increased urinary frequency and reduced flow over the past 60 days. He has a history of chronic prostatitis but currently has no pain, burning, or cloudiness in urine.  Diagnostic plan: A prostate screening level will be ordered to rule out any underlying issues. If symptoms persist or worsen, a referral to urology will be considered. The patient will undergo fasting blood work to check A1c levels for diabetes screening and a prostate screening level.  Clinical decision making: The patient notes that coffee exacerbates urinary symptoms, but these persist even without coffee consumption.  - PROSTATE SPECIFIC AG SCREENING; Future  - HEMOGLOBIN A1C; Future    8. Essential hypertension  Chronic, stable  The patient's blood pressure readings at home are around 140/95, but today's reading was 122/80.  Treatment plan: He is currently on Amlodipine 10 mg daily and Hyzaar 100-12.5 mg daily, which appear to be managing his blood pressure effectively. He is advised to continue monitoring his blood pressure at home, ensuring proper technique and conditions for accurate readings.  Recommend continue all current medications.  Clinical decision making: The patient is instructed to sit for a few minutes before taking readings and to ensure the wrist cuff is at heart level.    10. Tonsillith  Acute, uncomplicated  The patient reports occasional tonsil stones over the past six months.  Treatment plan: He is advised to maintain oral hygiene by brushing twice daily and gargling with salt water or mouthwash. If the frequency of tonsil stones increases or if persistent tonsillar swelling occurs, a referral to an ENT specialist will be considered.  Clinical decision making: The patient reports no significant  swelling or redness in the tonsils currently.        11. Screening for endocrine, metabolic and immunity disorder  - HEMOGLOBIN A1C; Future        Anticipatory guidance included the following: Patient counseled about skin care, diet, supplements, smoking, drugs/alcohol use, safe sex and exercise.     Return if symptoms worsen or fail to improve.    Please note that this dictation was created using voice recognition software. I have made every reasonable attempt to correct obvious errors, but I expect that there are errors of grammar and possibly content that I did not discover before finalizing the note.

## 2025-05-01 ENCOUNTER — RESULTS FOLLOW-UP (OUTPATIENT)
Dept: MEDICAL GROUP | Facility: MEDICAL CENTER | Age: 40
End: 2025-05-01
Payer: COMMERCIAL

## 2025-05-01 RX ORDER — AMLODIPINE BESYLATE 10 MG/1
10 TABLET ORAL DAILY
Qty: 90 TABLET | Refills: 3 | Status: SHIPPED | OUTPATIENT
Start: 2025-05-01

## 2025-05-01 RX ORDER — LOSARTAN POTASSIUM AND HYDROCHLOROTHIAZIDE 12.5; 1 MG/1; MG/1
1 TABLET ORAL DAILY
Qty: 90 TABLET | Refills: 3 | Status: SHIPPED | OUTPATIENT
Start: 2025-05-01

## 2025-05-06 NOTE — Clinical Note
REFERRAL APPROVAL NOTICE         Sent on May 6, 2025                   Roge Jeffriesgo  [address] [place] [address]                   Dear Mr. Meade,    After a careful review of the medical information and benefit coverage, [facility] has processed your referral. See below for additional details.    If applicable, you must be actively enrolled with your insurance for coverage of the authorized service. If you have any questions regarding your coverage, please contact your insurance directly.    REFERRAL INFORMATION   Referral #:  [id number]  Referred-To Department    Referred-By Provider:  Pulmonary and Sleep Medicine    TRAE Wolf   Pulmonary/sleep [facility]      [address]  [address]  [place] [address] [phone] [address], [address]  [place] [address] [phone]    Referral Start Date:  04/30/2025  Referral End Date:   04/30/2026           SCHEDULING  If you do not already have an appointment, please call [phone] to make an appointment.   MORE INFORMATION  As a reminder, [facility] ownership has changed, meaning this location is now owned and operated by [facility]. As such, we want to clarify that our patients should expect to receive two separate bills for the services received at [facility] - one representing the [facility] facility fees as the owner of the establishment, and the other to represent the physician's services and subsequent fees. You can speak with your insurance carrier for a pricing estimate by calling the customer service number on the back of your card and ask about charges for a hospital outpatient visit.  If you do not already have a [employer] account, sign up at: [email or web address]  You can access your medical information, make appointments, see lab results, billing  information, and more.  If you have questions regarding this referral, please contact  the [facility] department at:             [phone]. Monday - Friday 7:30AM - 5:00PM.      Sincerely,  [facility]

## 2025-05-06 NOTE — Clinical Note
REFERRAL APPROVAL NOTICE         Sent on May 6, 2025                   Guan Gabe Pereiradugo  [address] [place] [address]                   Dear Mr. Meade,    After a careful review of the medical information and benefit coverage, [facility] has processed your referral. See below for additional details.    If applicable, you must be actively enrolled with your insurance for coverage of the authorized service. If you have any questions regarding your coverage, please contact your insurance directly.    REFERRAL INFORMATION   Referral #:  [id number]  Referred-To Provider    Referred-By Provider:  Gastroenterology    TRAE Wolf   [facility]      [address]  [place] [address]  [phone] [address]  [place] [address]  [phone]    Referral Start Date:  04/30/2025  Referral End Date:   04/30/2026             SCHEDULING  If you do not already have an appointment, please call [phone] to make an appointment.     MORE INFORMATION  If you do not already have a [employer] account, sign up at: [email or web address]  You can access your medical information, make appointments, see lab results, billing information, and more.  If you have questions regarding this referral, please contact  the [facility] Referrals department at:             [phone]. Monday - Friday 8:00AM - 5:00PM.     Sincerely,    [facility]